# Patient Record
Sex: FEMALE | Race: WHITE | NOT HISPANIC OR LATINO
[De-identification: names, ages, dates, MRNs, and addresses within clinical notes are randomized per-mention and may not be internally consistent; named-entity substitution may affect disease eponyms.]

---

## 2021-05-07 ENCOUNTER — TRANSCRIPTION ENCOUNTER (OUTPATIENT)
Age: 36
End: 2021-05-07

## 2021-05-07 ENCOUNTER — OUTPATIENT (OUTPATIENT)
Dept: OUTPATIENT SERVICES | Facility: HOSPITAL | Age: 36
LOS: 1 days | End: 2021-05-07
Payer: COMMERCIAL

## 2021-05-07 DIAGNOSIS — Z20.828 CONTACT WITH AND (SUSPECTED) EXPOSURE TO OTHER VIRAL COMMUNICABLE DISEASES: ICD-10-CM

## 2021-05-07 LAB — SARS-COV-2 RNA SPEC QL NAA+PROBE: SIGNIFICANT CHANGE UP

## 2021-05-07 PROCEDURE — U0005: CPT

## 2021-05-07 PROCEDURE — C9803: CPT

## 2021-05-07 PROCEDURE — U0003: CPT

## 2021-05-08 DIAGNOSIS — Z20.828 CONTACT WITH AND (SUSPECTED) EXPOSURE TO OTHER VIRAL COMMUNICABLE DISEASES: ICD-10-CM

## 2021-05-10 DIAGNOSIS — Z12.31 ENCOUNTER FOR SCREENING MAMMOGRAM FOR MALIGNANT NEOPLASM OF BREAST: ICD-10-CM

## 2021-05-12 ENCOUNTER — APPOINTMENT (OUTPATIENT)
Dept: MAMMOGRAPHY | Facility: CLINIC | Age: 36
End: 2021-05-12
Payer: COMMERCIAL

## 2021-05-12 ENCOUNTER — RESULT REVIEW (OUTPATIENT)
Age: 36
End: 2021-05-12

## 2021-05-12 ENCOUNTER — OUTPATIENT (OUTPATIENT)
Dept: OUTPATIENT SERVICES | Facility: HOSPITAL | Age: 36
LOS: 1 days | End: 2021-05-12
Payer: COMMERCIAL

## 2021-05-12 DIAGNOSIS — Z12.31 ENCOUNTER FOR SCREENING MAMMOGRAM FOR MALIGNANT NEOPLASM OF BREAST: ICD-10-CM

## 2021-05-12 PROCEDURE — 77063 BREAST TOMOSYNTHESIS BI: CPT

## 2021-05-12 PROCEDURE — 77067 SCR MAMMO BI INCL CAD: CPT

## 2021-05-12 PROCEDURE — 77067 SCR MAMMO BI INCL CAD: CPT | Mod: 26

## 2021-05-12 PROCEDURE — 77063 BREAST TOMOSYNTHESIS BI: CPT | Mod: 26

## 2021-08-10 ENCOUNTER — APPOINTMENT (OUTPATIENT)
Dept: HUMAN REPRODUCTION | Facility: CLINIC | Age: 36
End: 2021-08-10
Payer: COMMERCIAL

## 2021-08-10 ENCOUNTER — APPOINTMENT (OUTPATIENT)
Dept: HUMAN REPRODUCTION | Facility: CLINIC | Age: 36
End: 2021-08-10

## 2021-08-10 PROCEDURE — 99204 OFFICE O/P NEW MOD 45 MIN: CPT | Mod: 95

## 2021-08-17 ENCOUNTER — APPOINTMENT (OUTPATIENT)
Dept: HUMAN REPRODUCTION | Facility: CLINIC | Age: 36
End: 2021-08-17

## 2021-09-01 ENCOUNTER — APPOINTMENT (OUTPATIENT)
Dept: HUMAN REPRODUCTION | Facility: CLINIC | Age: 36
End: 2021-09-01
Payer: COMMERCIAL

## 2021-09-01 PROCEDURE — 36415 COLL VENOUS BLD VENIPUNCTURE: CPT

## 2021-09-01 PROCEDURE — 99213 OFFICE O/P EST LOW 20 MIN: CPT | Mod: 25

## 2021-09-01 PROCEDURE — 76830 TRANSVAGINAL US NON-OB: CPT

## 2021-09-09 ENCOUNTER — APPOINTMENT (OUTPATIENT)
Dept: HUMAN REPRODUCTION | Facility: CLINIC | Age: 36
End: 2021-09-09
Payer: COMMERCIAL

## 2021-09-09 PROCEDURE — 99213 OFFICE O/P EST LOW 20 MIN: CPT | Mod: 25

## 2021-09-09 PROCEDURE — 76830 TRANSVAGINAL US NON-OB: CPT

## 2021-09-14 ENCOUNTER — APPOINTMENT (OUTPATIENT)
Dept: HUMAN REPRODUCTION | Facility: CLINIC | Age: 36
End: 2021-09-14
Payer: COMMERCIAL

## 2021-09-14 PROCEDURE — 99213 OFFICE O/P EST LOW 20 MIN: CPT | Mod: 25

## 2021-09-14 PROCEDURE — 36415 COLL VENOUS BLD VENIPUNCTURE: CPT

## 2021-09-14 PROCEDURE — 76830 TRANSVAGINAL US NON-OB: CPT

## 2021-09-21 ENCOUNTER — APPOINTMENT (OUTPATIENT)
Dept: HUMAN REPRODUCTION | Facility: CLINIC | Age: 36
End: 2021-09-21
Payer: COMMERCIAL

## 2021-09-21 PROCEDURE — 99213Y: CUSTOM | Mod: 25

## 2021-09-21 PROCEDURE — 36415 COLL VENOUS BLD VENIPUNCTURE: CPT

## 2021-09-21 PROCEDURE — 76830 TRANSVAGINAL US NON-OB: CPT

## 2021-10-01 ENCOUNTER — APPOINTMENT (OUTPATIENT)
Dept: HUMAN REPRODUCTION | Facility: CLINIC | Age: 36
End: 2021-10-01
Payer: COMMERCIAL

## 2021-10-01 PROCEDURE — 36415 COLL VENOUS BLD VENIPUNCTURE: CPT

## 2021-10-01 PROCEDURE — 99213 OFFICE O/P EST LOW 20 MIN: CPT | Mod: 25

## 2021-10-01 PROCEDURE — 76830 TRANSVAGINAL US NON-OB: CPT

## 2021-10-04 ENCOUNTER — MED ADMIN CHARGE (OUTPATIENT)
Age: 36
End: 2021-10-04

## 2021-10-04 ENCOUNTER — APPOINTMENT (OUTPATIENT)
Dept: FAMILY MEDICINE | Facility: CLINIC | Age: 36
End: 2021-10-04
Payer: COMMERCIAL

## 2021-10-04 PROCEDURE — G0008: CPT

## 2021-10-04 PROCEDURE — 90686 IIV4 VACC NO PRSV 0.5 ML IM: CPT

## 2021-10-08 ENCOUNTER — APPOINTMENT (OUTPATIENT)
Dept: HUMAN REPRODUCTION | Facility: CLINIC | Age: 36
End: 2021-10-08

## 2021-10-29 ENCOUNTER — APPOINTMENT (OUTPATIENT)
Dept: HUMAN REPRODUCTION | Facility: CLINIC | Age: 36
End: 2021-10-29
Payer: COMMERCIAL

## 2021-10-29 PROCEDURE — 76830 TRANSVAGINAL US NON-OB: CPT

## 2021-10-29 PROCEDURE — 36415 COLL VENOUS BLD VENIPUNCTURE: CPT

## 2021-10-29 PROCEDURE — 99213Y: CUSTOM | Mod: 25

## 2021-11-05 ENCOUNTER — APPOINTMENT (OUTPATIENT)
Dept: HUMAN REPRODUCTION | Facility: CLINIC | Age: 36
End: 2021-11-05
Payer: COMMERCIAL

## 2021-11-05 PROCEDURE — 99213 OFFICE O/P EST LOW 20 MIN: CPT | Mod: 25

## 2021-11-05 PROCEDURE — 76830 TRANSVAGINAL US NON-OB: CPT

## 2021-11-26 ENCOUNTER — APPOINTMENT (OUTPATIENT)
Dept: HUMAN REPRODUCTION | Facility: CLINIC | Age: 36
End: 2021-11-26
Payer: COMMERCIAL

## 2021-11-26 PROCEDURE — 36415 COLL VENOUS BLD VENIPUNCTURE: CPT

## 2021-11-26 PROCEDURE — 99213 OFFICE O/P EST LOW 20 MIN: CPT | Mod: 25

## 2021-11-26 PROCEDURE — 76830 TRANSVAGINAL US NON-OB: CPT

## 2021-12-02 ENCOUNTER — APPOINTMENT (OUTPATIENT)
Dept: INTERNAL MEDICINE | Facility: CLINIC | Age: 36
End: 2021-12-02
Payer: COMMERCIAL

## 2021-12-02 VITALS
OXYGEN SATURATION: 99 % | HEIGHT: 65 IN | SYSTOLIC BLOOD PRESSURE: 122 MMHG | WEIGHT: 273 LBS | DIASTOLIC BLOOD PRESSURE: 84 MMHG | BODY MASS INDEX: 45.48 KG/M2 | HEART RATE: 65 BPM | TEMPERATURE: 98.4 F

## 2021-12-02 DIAGNOSIS — Z80.3 FAMILY HISTORY OF MALIGNANT NEOPLASM OF BREAST: ICD-10-CM

## 2021-12-02 DIAGNOSIS — Z82.49 FAMILY HISTORY OF ISCHEMIC HEART DISEASE AND OTHER DISEASES OF THE CIRCULATORY SYSTEM: ICD-10-CM

## 2021-12-02 DIAGNOSIS — Z83.438 FAMILY HISTORY OF OTHER DISORDER OF LIPOPROTEIN METABOLISM AND OTHER LIPIDEMIA: ICD-10-CM

## 2021-12-02 DIAGNOSIS — E55.9 VITAMIN D DEFICIENCY, UNSPECIFIED: ICD-10-CM

## 2021-12-02 DIAGNOSIS — Z80.7 FAMILY HISTORY OF OTHER MALIGNANT NEOPLASMS OF LYMPHOID, HEMATOPOIETIC AND RELATED TISSUES: ICD-10-CM

## 2021-12-02 DIAGNOSIS — Z80.8 FAMILY HISTORY OF MALIGNANT NEOPLASM OF OTHER ORGANS OR SYSTEMS: ICD-10-CM

## 2021-12-02 PROCEDURE — 0013A: CPT

## 2021-12-02 PROCEDURE — 99385 PREV VISIT NEW AGE 18-39: CPT | Mod: 25

## 2021-12-02 NOTE — ASSESSMENT
[FreeTextEntry1] : modern booster given.\par All preventative measures were reviewed with the patient and the patient is due for and agrees to the following as outlined  in the plan  below.\par

## 2021-12-02 NOTE — HEALTH RISK ASSESSMENT
[No] : In the past 12 months have you used drugs other than those required for medical reasons? No [0] : 2) Feeling down, depressed, or hopeless: Not at all (0) [No falls in past year] : Patient reported no falls in the past year [PHQ-2 Negative - No further assessment needed] : PHQ-2 Negative - No further assessment needed [With Family] : lives with family [] :  [Smoke Detector] : smoke detector [Carbon Monoxide Detector] : carbon monoxide detector [Seat Belt] :  uses seat belt [Sunscreen] : uses sunscreen [With Patient/Caregiver] : , with patient/caregiver [] : No [de-identified] : no [de-identified] : no [OHK8Pidrx] : 0 [EyeExamDate] : 2019 [PapSmearDate] : 2020 [AdvancecareDate] : 12/2/21

## 2021-12-03 ENCOUNTER — APPOINTMENT (OUTPATIENT)
Dept: HUMAN REPRODUCTION | Facility: CLINIC | Age: 36
End: 2021-12-03
Payer: COMMERCIAL

## 2021-12-03 PROCEDURE — 76830 TRANSVAGINAL US NON-OB: CPT

## 2021-12-03 PROCEDURE — 99213 OFFICE O/P EST LOW 20 MIN: CPT | Mod: 25

## 2021-12-06 ENCOUNTER — APPOINTMENT (OUTPATIENT)
Dept: HUMAN REPRODUCTION | Facility: CLINIC | Age: 36
End: 2021-12-06

## 2021-12-16 ENCOUNTER — APPOINTMENT (OUTPATIENT)
Dept: HUMAN REPRODUCTION | Facility: CLINIC | Age: 36
End: 2021-12-16
Payer: COMMERCIAL

## 2021-12-16 PROCEDURE — 76830 TRANSVAGINAL US NON-OB: CPT

## 2021-12-16 PROCEDURE — 99213Y: CUSTOM | Mod: 25

## 2021-12-16 PROCEDURE — 36415 COLL VENOUS BLD VENIPUNCTURE: CPT

## 2021-12-29 ENCOUNTER — RX RENEWAL (OUTPATIENT)
Age: 36
End: 2021-12-29

## 2022-01-11 ENCOUNTER — TRANSCRIPTION ENCOUNTER (OUTPATIENT)
Age: 37
End: 2022-01-11

## 2022-01-11 LAB — SARS-COV-2 N GENE NPH QL NAA+PROBE: NOT DETECTED

## 2022-01-15 ENCOUNTER — TRANSCRIPTION ENCOUNTER (OUTPATIENT)
Age: 37
End: 2022-01-15

## 2022-02-03 ENCOUNTER — NON-APPOINTMENT (OUTPATIENT)
Age: 37
End: 2022-02-03

## 2022-02-03 ENCOUNTER — APPOINTMENT (OUTPATIENT)
Dept: DERMATOLOGY | Facility: CLINIC | Age: 37
End: 2022-02-03
Payer: COMMERCIAL

## 2022-02-03 PROCEDURE — 99204 OFFICE O/P NEW MOD 45 MIN: CPT | Mod: 25

## 2022-02-03 PROCEDURE — 11200 RMVL SKIN TAGS UP TO&INC 15: CPT

## 2022-02-04 ENCOUNTER — APPOINTMENT (OUTPATIENT)
Dept: OPHTHALMOLOGY | Facility: CLINIC | Age: 37
End: 2022-02-04
Payer: COMMERCIAL

## 2022-02-04 ENCOUNTER — NON-APPOINTMENT (OUTPATIENT)
Age: 37
End: 2022-02-04

## 2022-02-04 PROCEDURE — 92133 CPTRZD OPH DX IMG PST SGM ON: CPT

## 2022-02-04 PROCEDURE — 92004 COMPRE OPH EXAM NEW PT 1/>: CPT

## 2022-02-14 ENCOUNTER — APPOINTMENT (OUTPATIENT)
Dept: HUMAN REPRODUCTION | Facility: CLINIC | Age: 37
End: 2022-02-14
Payer: COMMERCIAL

## 2022-02-14 PROCEDURE — 36415 COLL VENOUS BLD VENIPUNCTURE: CPT

## 2022-02-14 PROCEDURE — 76830 TRANSVAGINAL US NON-OB: CPT

## 2022-02-14 PROCEDURE — 99213 OFFICE O/P EST LOW 20 MIN: CPT | Mod: 25

## 2022-02-17 ENCOUNTER — APPOINTMENT (OUTPATIENT)
Dept: OBGYN | Facility: CLINIC | Age: 37
End: 2022-02-17
Payer: COMMERCIAL

## 2022-02-17 VITALS
SYSTOLIC BLOOD PRESSURE: 118 MMHG | WEIGHT: 277 LBS | BODY MASS INDEX: 46.15 KG/M2 | DIASTOLIC BLOOD PRESSURE: 88 MMHG | HEIGHT: 65 IN

## 2022-02-17 PROCEDURE — 99385 PREV VISIT NEW AGE 18-39: CPT

## 2022-02-17 NOTE — PHYSICAL EXAM
[Appropriately responsive] : appropriately responsive [Alert] : alert [No Acute Distress] : no acute distress [No Lymphadenopathy] : no lymphadenopathy [Oriented x3] : oriented x3 [Examination Of The Breasts] : a normal appearance [No Discharge] : no discharge [No Masses] : no breast masses were palpable [Labia Majora] : normal [Labia Minora] : normal [Normal] : normal [FreeTextEntry4] : moderate thin white vaginal d/c consistent with BV - pt with no complaints of abnormal vaginal discharge

## 2022-02-17 NOTE — HISTORY OF PRESENT ILLNESS
[Patient reported mammogram was normal] : Patient reported mammogram was normal [Regular Cycle Intervals] : periods have been regular [Currently Active] : currently active [Men] : men [No] : No [TextBox_4] : Chanell is a 35 y/o  who presents today for an annual exam. She has no gyn complaints.\par \par She has regular menses. She has been seeing Dr. Zhang for infertility treatment.\par \par She is s/p a normal screening mammogram 2021.  She has a P. Aunt with a history of breast cancer.\par \par she is a  for a private primary MD in [place] [Mammogramdate] : 5/12/21 [FreeTextEntry1] : 2/10/22

## 2022-02-18 LAB — HPV HIGH+LOW RISK DNA PNL CVX: NOT DETECTED

## 2022-02-22 ENCOUNTER — NON-APPOINTMENT (OUTPATIENT)
Age: 37
End: 2022-02-22

## 2022-02-22 DIAGNOSIS — N76.0 ACUTE VAGINITIS: ICD-10-CM

## 2022-02-28 ENCOUNTER — APPOINTMENT (OUTPATIENT)
Dept: HUMAN REPRODUCTION | Facility: CLINIC | Age: 37
End: 2022-02-28

## 2022-04-28 ENCOUNTER — APPOINTMENT (OUTPATIENT)
Dept: FAMILY MEDICINE | Facility: CLINIC | Age: 37
End: 2022-04-28
Payer: COMMERCIAL

## 2022-04-28 VITALS
TEMPERATURE: 96.9 F | HEIGHT: 65 IN | OXYGEN SATURATION: 99 % | DIASTOLIC BLOOD PRESSURE: 82 MMHG | HEART RATE: 86 BPM | SYSTOLIC BLOOD PRESSURE: 120 MMHG

## 2022-04-28 PROCEDURE — 99213 OFFICE O/P EST LOW 20 MIN: CPT

## 2022-04-28 NOTE — REVIEW OF SYSTEMS
[Back Pain] : back pain [Headache] : headache [Dizziness] : no dizziness [Fainting] : no fainting [Confusion] : no confusion [Negative] : Psychiatric

## 2022-04-28 NOTE — HISTORY OF PRESENT ILLNESS
[FreeTextEntry8] : Patient is a 35yo female presenting to the office complaining of recurrent migraine headaches.\par Pt has been having bad migraines over the past several weeks.\par Has had migraines for many years.\par Has at least monthly, cannot pinpoint reasons, could be dehydration/stress related.\par Pt has been taking OTC medications (Ibuprofen/Acetaminophen) for headaches with some improvement.\par Pt has neck issues/stiffness that sometimes causes migraines.\par Pt states she has tried massage in the past with some improvement.\par Pt interested in acupuncture and possible breast reduction surgery.\par Has also tried PT in the past with some improvement as well.\par Pt has never been on prescription medication for migraines and has never seen Neurology.\par \par Pt requesting RF for Xanax, takes as needed for flight anxiety and otherwise rarely as needed.  Last RF >1 year ago ISTOP confirmed.

## 2022-04-28 NOTE — ASSESSMENT
[FreeTextEntry1] : Pt is a 35yo female presenting to the office complaining of worsening migraine headache symptoms and more frequent migraine headaches.  Interested in acupuncture/massage and possible breast reduction surgery in the future as migraines occasionally associated with neck strain from weight of breasts.  Also requesting Xanax RF.\par \par Migraine Headaches\par - Continue OTC medications as needed for migraines, incorporate Excedrin as well.\par - Sumatriptan ordered as needed for severe headaches.\par - Follow up with Neurology if symptoms persist/worsen.\par \par Neck Strain\par - Massage and Acupuncture RX provided.\par - Rest, gentle stretching, heat, Ibuprofen/Acetaminophen as needed.\par - Consider plastic surgery consult for breast reduction surgery.\par \par Flight Anxiety/generalized anxiety\par - RX for Xanax 0.25mg as needed sent to pharmacy.\par - Pt understands to take this medication sparingly only as needed.\par - Do not drink alcohol, drive, or operate heavy machinery when taking Xanax as it causes drowsiness.\par \par Call the office or go to the ED immediately if you develop new, worsening or concerning symptoms including high fever, severe headache/worst headache of your life, confusion, dizziness/lightheadedness, loss of consciousness, severe chest pain, difficulty breathing, shortness of breath, severe abdominal pain, excessive vomiting/diarrhea, inability to feel/move the extremities, or any other concerning symptoms.\par

## 2022-05-13 ENCOUNTER — APPOINTMENT (OUTPATIENT)
Dept: MAMMOGRAPHY | Facility: CLINIC | Age: 37
End: 2022-05-13
Payer: COMMERCIAL

## 2022-05-13 ENCOUNTER — RESULT REVIEW (OUTPATIENT)
Age: 37
End: 2022-05-13

## 2022-05-13 ENCOUNTER — OUTPATIENT (OUTPATIENT)
Dept: OUTPATIENT SERVICES | Facility: HOSPITAL | Age: 37
LOS: 1 days | End: 2022-05-13
Payer: COMMERCIAL

## 2022-05-13 DIAGNOSIS — Z12.39 ENCOUNTER FOR OTHER SCREENING FOR MALIGNANT NEOPLASM OF BREAST: ICD-10-CM

## 2022-05-13 PROCEDURE — 77067 SCR MAMMO BI INCL CAD: CPT | Mod: 26

## 2022-05-13 PROCEDURE — 77063 BREAST TOMOSYNTHESIS BI: CPT

## 2022-05-13 PROCEDURE — 77063 BREAST TOMOSYNTHESIS BI: CPT | Mod: 26

## 2022-05-13 PROCEDURE — 77067 SCR MAMMO BI INCL CAD: CPT

## 2022-06-24 ENCOUNTER — NON-APPOINTMENT (OUTPATIENT)
Age: 37
End: 2022-06-24

## 2022-06-27 RX ORDER — FENTANYL CITRATE 50 UG/ML
50 INJECTION INTRAVENOUS
Refills: 0 | Status: DISCONTINUED | OUTPATIENT
Start: 2022-06-28 | End: 2022-06-28

## 2022-06-27 RX ORDER — SODIUM CHLORIDE 9 MG/ML
1000 INJECTION, SOLUTION INTRAVENOUS
Refills: 0 | Status: DISCONTINUED | OUTPATIENT
Start: 2022-06-28 | End: 2022-06-28

## 2022-06-27 RX ORDER — PROCHLORPERAZINE MALEATE 5 MG
10 TABLET ORAL ONCE
Refills: 0 | Status: DISCONTINUED | OUTPATIENT
Start: 2022-06-28 | End: 2022-06-28

## 2022-06-27 RX ORDER — MEPERIDINE HYDROCHLORIDE 50 MG/ML
12.5 INJECTION INTRAMUSCULAR; INTRAVENOUS; SUBCUTANEOUS
Refills: 0 | Status: DISCONTINUED | OUTPATIENT
Start: 2022-06-28 | End: 2022-06-28

## 2022-06-27 RX ORDER — FENTANYL CITRATE 50 UG/ML
25 INJECTION INTRAVENOUS
Refills: 0 | Status: DISCONTINUED | OUTPATIENT
Start: 2022-06-28 | End: 2022-06-28

## 2022-06-28 ENCOUNTER — TRANSCRIPTION ENCOUNTER (OUTPATIENT)
Age: 37
End: 2022-06-28

## 2022-06-28 ENCOUNTER — OUTPATIENT (OUTPATIENT)
Dept: INPATIENT UNIT | Facility: HOSPITAL | Age: 37
LOS: 1 days | Discharge: HOME | End: 2022-06-28
Payer: COMMERCIAL

## 2022-06-28 ENCOUNTER — RESULT REVIEW (OUTPATIENT)
Age: 37
End: 2022-06-28

## 2022-06-28 VITALS
RESPIRATION RATE: 16 BRPM | TEMPERATURE: 97 F | SYSTOLIC BLOOD PRESSURE: 136 MMHG | OXYGEN SATURATION: 100 % | HEART RATE: 78 BPM | DIASTOLIC BLOOD PRESSURE: 86 MMHG

## 2022-06-28 VITALS
SYSTOLIC BLOOD PRESSURE: 143 MMHG | OXYGEN SATURATION: 99 % | HEART RATE: 80 BPM | WEIGHT: 260.59 LBS | DIASTOLIC BLOOD PRESSURE: 78 MMHG | HEIGHT: 65 IN | TEMPERATURE: 97 F | RESPIRATION RATE: 16 BRPM

## 2022-06-28 DIAGNOSIS — N62 HYPERTROPHY OF BREAST: ICD-10-CM

## 2022-06-28 DIAGNOSIS — G43.909 MIGRAINE, UNSPECIFIED, NOT INTRACTABLE, WITHOUT STATUS MIGRAINOSUS: ICD-10-CM

## 2022-06-28 DIAGNOSIS — Z98.890 OTHER SPECIFIED POSTPROCEDURAL STATES: Chronic | ICD-10-CM

## 2022-06-28 DIAGNOSIS — M54.2 CERVICALGIA: ICD-10-CM

## 2022-06-28 LAB — HCG UR QL: NEGATIVE — SIGNIFICANT CHANGE UP

## 2022-06-28 PROCEDURE — 88305 TISSUE EXAM BY PATHOLOGIST: CPT

## 2022-06-28 PROCEDURE — 81025 URINE PREGNANCY TEST: CPT

## 2022-06-28 PROCEDURE — 88305 TISSUE EXAM BY PATHOLOGIST: CPT | Mod: 26

## 2022-06-28 RX ORDER — OXYCODONE HYDROCHLORIDE 5 MG/1
5 TABLET ORAL ONCE
Refills: 0 | Status: DISCONTINUED | OUTPATIENT
Start: 2022-06-28 | End: 2022-06-28

## 2022-06-28 RX ORDER — BENZOCAINE AND MENTHOL 5; 1 G/100ML; G/100ML
1 LIQUID ORAL
Refills: 0 | Status: DISCONTINUED | OUTPATIENT
Start: 2022-06-28 | End: 2022-06-28

## 2022-06-28 RX ORDER — ONDANSETRON 8 MG/1
4 TABLET, FILM COATED ORAL ONCE
Refills: 0 | Status: COMPLETED | OUTPATIENT
Start: 2022-06-28 | End: 2022-06-28

## 2022-06-28 RX ADMIN — FENTANYL CITRATE 50 MICROGRAM(S): 50 INJECTION INTRAVENOUS at 12:02

## 2022-06-28 RX ADMIN — OXYCODONE HYDROCHLORIDE 5 MILLIGRAM(S): 5 TABLET ORAL at 10:26

## 2022-06-28 RX ADMIN — OXYCODONE HYDROCHLORIDE 5 MILLIGRAM(S): 5 TABLET ORAL at 12:02

## 2022-06-28 RX ADMIN — OXYCODONE HYDROCHLORIDE 5 MILLIGRAM(S): 5 TABLET ORAL at 11:46

## 2022-06-28 RX ADMIN — ONDANSETRON 4 MILLIGRAM(S): 8 TABLET, FILM COATED ORAL at 12:04

## 2022-06-28 RX ADMIN — OXYCODONE HYDROCHLORIDE 5 MILLIGRAM(S): 5 TABLET ORAL at 11:34

## 2022-06-28 RX ADMIN — FENTANYL CITRATE 50 MICROGRAM(S): 50 INJECTION INTRAVENOUS at 11:35

## 2022-06-28 RX ADMIN — FENTANYL CITRATE 50 MICROGRAM(S): 50 INJECTION INTRAVENOUS at 11:43

## 2022-06-28 RX ADMIN — BENZOCAINE AND MENTHOL 1 LOZENGE: 5; 1 LIQUID ORAL at 12:01

## 2022-06-28 RX ADMIN — SODIUM CHLORIDE 100 MILLILITER(S): 9 INJECTION, SOLUTION INTRAVENOUS at 10:27

## 2022-06-28 RX ADMIN — FENTANYL CITRATE 50 MICROGRAM(S): 50 INJECTION INTRAVENOUS at 10:24

## 2022-06-28 NOTE — ASU DISCHARGE PLAN (ADULT/PEDIATRIC) - NURSING INSTRUCTIONS
For any problems or concerns,contact your doctor. [facility] patients should call the [facility]. If you cannot reach the doctor or clinic, call [facility] Emergency Department at [phone] or go to your local Emergency Department.  A responsible adult should be with you for the rest of the day and night for your safety and to help you if you needed. Resume your medications as listed on the attached Medication Record. Begin with liquids and light food ( tea, toast, Jello, soups). Advance to what you normally eat. Liquids should taken in adequate amounts today.     CALL the DOCTOR:    -Fever greater than  101F  - Signs  of infection such as : increase pain,swelling,redness,or a bad  smell coming from the wound.  -Excessive amount of bleeding.  - Any pain that appears to be getting worse.  - Vomiting  -  If you have  not urinated 8 hours after surgery or have any difficulty urinating.     A responsible adult should be with you for the rest of the day and night for your safety and to help you if you needed.    Review attached FACT SHEET if applicable.

## 2022-06-28 NOTE — ASU DISCHARGE PLAN (ADULT/PEDIATRIC) - ASU DC SPECIAL INSTRUCTIONSFT
You should follow-up in the office on Friday for drain evaluation, please call for follow-up appointment if you have not already made one. [phone].   You have bilateral drains, record output and bring recording with you to your post operative visit.   Prescription pain medications have been prescribed for you, take as needed for pain only. Do not drive a vehicle or operate machinery while taking narcotic pain medication.   Ambulating is very important post operatively, make sure you ambulate several times daily.   If you experience bleeding that does not stop, swelling, hardness of surgical site, chest pain, shortness of breath, leg pain, dizziness or any signs or symptoms of infection such as fever, redness, pus, foul smell of surgical site please contact the office immediately, [phone].

## 2022-06-28 NOTE — ASU DISCHARGE PLAN (ADULT/PEDIATRIC) - NS MD DC FALL RISK RISK
For information on Fall & Injury Prevention, visit: [email or web address] OR  [email or web address] OR  https://www.cdc.gov/steadi/patient.html

## 2022-06-28 NOTE — ASU PATIENT PROFILE, ADULT - FALL HARM RISK - UNIVERSAL INTERVENTIONS
Bed in lowest position, wheels locked, appropriate side rails in place/Call bell, personal items and telephone in reach/Instruct patient to call for assistance before getting out of bed or chair/Non-slip footwear when patient is out of bed/[place] to call system/Physically safe environment - no spills, clutter or unnecessary equipment/Purposeful Proactive Rounding/Room/bathroom lighting operational, light cord in reach

## 2022-07-06 DIAGNOSIS — N64.81 PTOSIS OF BREAST: ICD-10-CM

## 2022-07-06 DIAGNOSIS — N62 HYPERTROPHY OF BREAST: ICD-10-CM

## 2022-07-06 DIAGNOSIS — F41.9 ANXIETY DISORDER, UNSPECIFIED: ICD-10-CM

## 2022-08-03 ENCOUNTER — LABORATORY RESULT (OUTPATIENT)
Age: 37
End: 2022-08-03

## 2022-08-04 ENCOUNTER — LABORATORY RESULT (OUTPATIENT)
Age: 37
End: 2022-08-04

## 2022-08-04 LAB
25(OH)D3 SERPL-MCNC: 29.7 NG/ML
ALBUMIN SERPL ELPH-MCNC: 4.5 G/DL
ALP BLD-CCNC: 79 U/L
ALT SERPL-CCNC: 7 U/L
ANION GAP SERPL CALC-SCNC: 10 MMOL/L
AST SERPL-CCNC: 9 U/L
BASOPHILS # BLD AUTO: 0.03 K/UL
BASOPHILS NFR BLD AUTO: 0.5 %
BILIRUB SERPL-MCNC: 0.5 MG/DL
BUN SERPL-MCNC: 10 MG/DL
CALCIUM SERPL-MCNC: 9.3 MG/DL
CHLORIDE SERPL-SCNC: 103 MMOL/L
CHOLEST SERPL-MCNC: 179 MG/DL
CO2 SERPL-SCNC: 25 MMOL/L
CREAT SERPL-MCNC: 0.67 MG/DL
EGFR: 114 ML/MIN/1.73M2
EOSINOPHIL # BLD AUTO: 0.11 K/UL
EOSINOPHIL NFR BLD AUTO: 1.9 %
GLUCOSE SERPL-MCNC: 94 MG/DL
HCT VFR BLD CALC: 40.1 %
HDLC SERPL-MCNC: 43 MG/DL
HGB BLD-MCNC: 12.8 G/DL
IMM GRANULOCYTES NFR BLD AUTO: 0.2 %
LDLC SERPL CALC-MCNC: 105 MG/DL
LYMPHOCYTES # BLD AUTO: 1.65 K/UL
LYMPHOCYTES NFR BLD AUTO: 28.1 %
MAN DIFF?: NORMAL
MCHC RBC-ENTMCNC: 26.8 PG
MCHC RBC-ENTMCNC: 31.9 GM/DL
MCV RBC AUTO: 84.1 FL
MONOCYTES # BLD AUTO: 0.46 K/UL
MONOCYTES NFR BLD AUTO: 7.8 %
NEUTROPHILS # BLD AUTO: 3.61 K/UL
NEUTROPHILS NFR BLD AUTO: 61.5 %
NONHDLC SERPL-MCNC: 136 MG/DL
PLATELET # BLD AUTO: 285 K/UL
POTASSIUM SERPL-SCNC: 3.9 MMOL/L
PROT SERPL-MCNC: 7.2 G/DL
RBC # BLD: 4.77 M/UL
RBC # FLD: 13.2 %
SODIUM SERPL-SCNC: 139 MMOL/L
T4 SERPL-MCNC: 7.1 UG/DL
TRIGL SERPL-MCNC: 153 MG/DL
TSH SERPL-ACNC: 2.03 UIU/ML
WBC # FLD AUTO: 5.87 K/UL

## 2022-08-07 LAB
APPEARANCE: CLEAR
BILIRUBIN URINE: NEGATIVE
BLOOD URINE: NEGATIVE
COLOR: NORMAL
GLUCOSE QUALITATIVE U: NEGATIVE
HCV AB SER QL: NONREACTIVE
HCV S/CO RATIO: 0.15 S/CO
KETONES URINE: NEGATIVE
LEUKOCYTE ESTERASE URINE: ABNORMAL
NITRITE URINE: NEGATIVE
PH URINE: 7
PROTEIN URINE: NEGATIVE
SPECIFIC GRAVITY URINE: 1.01
UROBILINOGEN URINE: NORMAL

## 2022-08-08 ENCOUNTER — NON-APPOINTMENT (OUTPATIENT)
Age: 37
End: 2022-08-08

## 2022-08-11 ENCOUNTER — APPOINTMENT (OUTPATIENT)
Dept: INTERNAL MEDICINE | Facility: CLINIC | Age: 37
End: 2022-08-11

## 2022-08-11 VITALS
HEART RATE: 87 BPM | TEMPERATURE: 98.6 F | HEIGHT: 65 IN | BODY MASS INDEX: 45.65 KG/M2 | DIASTOLIC BLOOD PRESSURE: 70 MMHG | SYSTOLIC BLOOD PRESSURE: 112 MMHG | WEIGHT: 274 LBS | OXYGEN SATURATION: 99 %

## 2022-08-11 DIAGNOSIS — U07.1 COVID-19: ICD-10-CM

## 2022-08-11 PROCEDURE — 99395 PREV VISIT EST AGE 18-39: CPT

## 2022-08-11 PROCEDURE — G0444 DEPRESSION SCREEN ANNUAL: CPT | Mod: 59

## 2022-08-11 RX ORDER — FLUCONAZOLE 150 MG/1
150 TABLET ORAL
Qty: 2 | Refills: 0 | Status: DISCONTINUED | COMMUNITY
Start: 2022-02-22 | End: 2022-08-11

## 2022-08-11 RX ORDER — DICYCLOMINE HYDROCHLORIDE 10 MG/1
10 CAPSULE ORAL DAILY
Qty: 30 | Refills: 0 | Status: DISCONTINUED | COMMUNITY
Start: 2021-12-02 | End: 2022-08-11

## 2022-08-11 RX ORDER — METRONIDAZOLE 7.5 MG/G
0.75 GEL VAGINAL
Qty: 1 | Refills: 0 | Status: DISCONTINUED | COMMUNITY
Start: 2022-02-22 | End: 2022-08-11

## 2022-08-11 NOTE — HEALTH RISK ASSESSMENT
[Never] : Never [No] : In the past 12 months have you used drugs other than those required for medical reasons? No [0] : 2) Feeling down, depressed, or hopeless: Not at all (0) [Patient reported mammogram was normal] : Patient reported mammogram was normal [Patient reported PAP Smear was normal] : Patient reported PAP Smear was normal [No falls in past year] : Patient reported no falls in the past year [PHQ-2 Negative - No further assessment needed] : PHQ-2 Negative - No further assessment needed [None] : None [With Family] : lives with family [] :  [Fully functional (bathing, dressing, toileting, transferring, walking, feeding)] : Fully functional (bathing, dressing, toileting, transferring, walking, feeding) [Fully functional (using the telephone, shopping, preparing meals, housekeeping, doing laundry, using] : Fully functional and needs no help or supervision to perform IADLs (using the telephone, shopping, preparing meals, housekeeping, doing laundry, using transportation, managing medications and managing finances) [Smoke Detector] : smoke detector [Carbon Monoxide Detector] : carbon monoxide detector [Seat Belt] :  uses seat belt [Sunscreen] : uses sunscreen [With Patient/Caregiver] : , with patient/caregiver [de-identified] : no [de-identified] : reg [LHG4Odhkr] : 0 [EyeExamDate] : 01/2022 [Change in mental status noted] : No change in mental status noted [MammogramDate] : 5/13/22 [PapSmearDate] : 2/17/22 [AdvancecareDate] : 8/11/22

## 2022-08-23 DIAGNOSIS — B37.9 CANDIDIASIS, UNSPECIFIED: ICD-10-CM

## 2022-08-23 PROBLEM — Z78.9 OTHER SPECIFIED HEALTH STATUS: Chronic | Status: ACTIVE | Noted: 2022-06-28

## 2022-08-29 NOTE — ASU PREOP CHECKLIST - PATIENT'S PERSONAL PROPERTY GIVEN TO
"Attempted to medicate pt. Pt states "Only morphine and Dilaudid work for me and if I am not going to get that here then its best that I go home". MD made aware.   " 3B/on unit

## 2022-10-21 ENCOUNTER — MED ADMIN CHARGE (OUTPATIENT)
Age: 37
End: 2022-10-21

## 2023-02-03 ENCOUNTER — NON-APPOINTMENT (OUTPATIENT)
Age: 38
End: 2023-02-03

## 2023-02-03 ENCOUNTER — APPOINTMENT (OUTPATIENT)
Dept: OPHTHALMOLOGY | Facility: CLINIC | Age: 38
End: 2023-02-03
Payer: COMMERCIAL

## 2023-02-03 PROCEDURE — 92250 FUNDUS PHOTOGRAPHY W/I&R: CPT

## 2023-02-03 PROCEDURE — 92014 COMPRE OPH EXAM EST PT 1/>: CPT

## 2023-02-09 ENCOUNTER — APPOINTMENT (OUTPATIENT)
Dept: DERMATOLOGY | Facility: CLINIC | Age: 38
End: 2023-02-09
Payer: COMMERCIAL

## 2023-02-09 PROCEDURE — 99213 OFFICE O/P EST LOW 20 MIN: CPT

## 2023-02-27 ENCOUNTER — NON-APPOINTMENT (OUTPATIENT)
Age: 38
End: 2023-02-27

## 2023-03-06 ENCOUNTER — APPOINTMENT (OUTPATIENT)
Dept: OBGYN | Facility: CLINIC | Age: 38
End: 2023-03-06
Payer: COMMERCIAL

## 2023-03-06 VITALS
DIASTOLIC BLOOD PRESSURE: 80 MMHG | WEIGHT: 270 LBS | SYSTOLIC BLOOD PRESSURE: 128 MMHG | BODY MASS INDEX: 44.98 KG/M2 | HEIGHT: 65 IN

## 2023-03-06 PROCEDURE — 99395 PREV VISIT EST AGE 18-39: CPT

## 2023-03-06 NOTE — HISTORY OF PRESENT ILLNESS
[Currently Active] : currently active [No] : No [TextBox_4] : Chanell is a 36 y/o  who presents today for an annual exam.\par \par She was previously seeing Dr. Zhang for fertility treatments but has decided to discontinue seeking pregnancy unless it happens spontaneously.\par \par She is a  for a private primary MD in [place] [FreeTextEntry1] : 2/27/23

## 2023-03-06 NOTE — DISCUSSION/SUMMARY
[FreeTextEntry1] : 1) pap performed\par 2) rx for screening mammo issued\par \par Return to office in one year, sooner prn

## 2023-03-07 LAB — CYTOLOGY CVX/VAG DOC THIN PREP: NORMAL

## 2023-03-08 LAB — HPV HIGH+LOW RISK DNA PNL CVX: NOT DETECTED

## 2023-03-13 ENCOUNTER — TRANSCRIPTION ENCOUNTER (OUTPATIENT)
Age: 38
End: 2023-03-13

## 2023-03-27 RX ORDER — FLUTICASONE PROPIONATE 50 UG/1
50 SPRAY, METERED NASAL DAILY
Qty: 1 | Refills: 1 | Status: ACTIVE | COMMUNITY
Start: 2023-03-27 | End: 1900-01-01

## 2023-04-07 LAB
25(OH)D3 SERPL-MCNC: 15.7 NG/ML
ALBUMIN SERPL ELPH-MCNC: 4.3 G/DL
ALP BLD-CCNC: 74 U/L
ALT SERPL-CCNC: 9 U/L
ANION GAP SERPL CALC-SCNC: 12 MMOL/L
APPEARANCE: CLEAR
AST SERPL-CCNC: 11 U/L
BACTERIA: NEGATIVE
BASOPHILS # BLD AUTO: 0.04 K/UL
BASOPHILS NFR BLD AUTO: 0.8 %
BILIRUB DIRECT SERPL-MCNC: 0.1 MG/DL
BILIRUB SERPL-MCNC: 0.5 MG/DL
BILIRUBIN URINE: NEGATIVE
BLOOD URINE: NEGATIVE
BUN SERPL-MCNC: 12 MG/DL
CALCIUM SERPL-MCNC: 9.6 MG/DL
CHLORIDE SERPL-SCNC: 104 MMOL/L
CHOLEST SERPL-MCNC: 178 MG/DL
CO2 SERPL-SCNC: 24 MMOL/L
COLOR: COLORLESS
CREAT SERPL-MCNC: 0.74 MG/DL
EGFR: 105 ML/MIN/1.73M2
EOSINOPHIL # BLD AUTO: 0.09 K/UL
EOSINOPHIL NFR BLD AUTO: 1.7 %
ESTIMATED AVERAGE GLUCOSE: 108 MG/DL
FERRITIN SERPL-MCNC: 12 NG/ML
GLUCOSE QUALITATIVE U: NEGATIVE
GLUCOSE SERPL-MCNC: 85 MG/DL
HBA1C MFR BLD HPLC: 5.4 %
HCT VFR BLD CALC: 38.1 %
HCV AB SER QL: NONREACTIVE
HCV S/CO RATIO: 0.17 S/CO
HDLC SERPL-MCNC: 42 MG/DL
HGB BLD-MCNC: 11.6 G/DL
HYALINE CASTS: 1 /LPF
IMM GRANULOCYTES NFR BLD AUTO: 0.2 %
IRON SERPL-MCNC: 34 UG/DL
KETONES URINE: NEGATIVE
LDLC SERPL CALC-MCNC: 112 MG/DL
LEUKOCYTE ESTERASE URINE: ABNORMAL
LYMPHOCYTES # BLD AUTO: 1.71 K/UL
LYMPHOCYTES NFR BLD AUTO: 33 %
MAN DIFF?: NORMAL
MCHC RBC-ENTMCNC: 26.2 PG
MCHC RBC-ENTMCNC: 30.4 GM/DL
MCV RBC AUTO: 86 FL
MICROSCOPIC-UA: NORMAL
MONOCYTES # BLD AUTO: 0.4 K/UL
MONOCYTES NFR BLD AUTO: 7.7 %
NEUTROPHILS # BLD AUTO: 2.93 K/UL
NEUTROPHILS NFR BLD AUTO: 56.6 %
NITRITE URINE: NEGATIVE
NONHDLC SERPL-MCNC: 136 MG/DL
PH URINE: 6.5
PLATELET # BLD AUTO: 320 K/UL
POTASSIUM SERPL-SCNC: 4.2 MMOL/L
PROT SERPL-MCNC: 7.1 G/DL
PROTEIN URINE: NEGATIVE
RBC # BLD: 4.43 M/UL
RBC # FLD: 13.4 %
RED BLOOD CELLS URINE: 1 /HPF
SODIUM SERPL-SCNC: 140 MMOL/L
SPECIFIC GRAVITY URINE: 1.01
SQUAMOUS EPITHELIAL CELLS: 4 /HPF
T4 SERPL-MCNC: 8.1 UG/DL
TRIGL SERPL-MCNC: 123 MG/DL
TSH SERPL-ACNC: 2.08 UIU/ML
UROBILINOGEN URINE: NORMAL
WBC # FLD AUTO: 5.18 K/UL
WHITE BLOOD CELLS URINE: 17 /HPF

## 2023-04-13 ENCOUNTER — APPOINTMENT (OUTPATIENT)
Dept: INTERNAL MEDICINE | Facility: CLINIC | Age: 38
End: 2023-04-13
Payer: COMMERCIAL

## 2023-04-13 VITALS
BODY MASS INDEX: 45.32 KG/M2 | DIASTOLIC BLOOD PRESSURE: 84 MMHG | OXYGEN SATURATION: 98 % | HEART RATE: 68 BPM | TEMPERATURE: 98.4 F | HEIGHT: 65 IN | WEIGHT: 272 LBS | SYSTOLIC BLOOD PRESSURE: 128 MMHG

## 2023-04-13 DIAGNOSIS — Z23 ENCOUNTER FOR IMMUNIZATION: ICD-10-CM

## 2023-04-13 DIAGNOSIS — Z86.69 PERSONAL HISTORY OF OTHER DISEASES OF THE NERVOUS SYSTEM AND SENSE ORGANS: ICD-10-CM

## 2023-04-13 DIAGNOSIS — S16.1XXA STRAIN OF MUSCLE, FASCIA AND TENDON AT NECK LEVEL, INITIAL ENCOUNTER: ICD-10-CM

## 2023-04-13 DIAGNOSIS — Z86.018 PERSONAL HISTORY OF OTHER BENIGN NEOPLASM: ICD-10-CM

## 2023-04-13 DIAGNOSIS — Z12.4 ENCOUNTER FOR SCREENING FOR MALIGNANT NEOPLASM OF CERVIX: ICD-10-CM

## 2023-04-13 PROCEDURE — 99395 PREV VISIT EST AGE 18-39: CPT

## 2023-04-13 RX ORDER — FLUCONAZOLE 150 MG/1
150 TABLET ORAL
Qty: 2 | Refills: 0 | Status: DISCONTINUED | COMMUNITY
Start: 2022-08-23 | End: 2023-04-13

## 2023-04-13 RX ORDER — ERYTHROMYCIN 5 MG/G
5 OINTMENT OPHTHALMIC
Qty: 1 | Refills: 0 | Status: DISCONTINUED | COMMUNITY
Start: 2023-03-26 | End: 2023-04-13

## 2023-04-13 NOTE — HEALTH RISK ASSESSMENT
[No] : In the past 12 months have you used drugs other than those required for medical reasons? No [0] : 2) Feeling down, depressed, or hopeless: Not at all (0) [Patient reported PAP Smear was normal] : Patient reported PAP Smear was normal [Never] : Never [No falls in past year] : Patient reported no falls in the past year [PHQ-2 Negative - No further assessment needed] : PHQ-2 Negative - No further assessment needed [None] : None [With Family] : lives with family [] :  [Fully functional (bathing, dressing, toileting, transferring, walking, feeding)] : Fully functional (bathing, dressing, toileting, transferring, walking, feeding) [Fully functional (using the telephone, shopping, preparing meals, housekeeping, doing laundry, using] : Fully functional and needs no help or supervision to perform IADLs (using the telephone, shopping, preparing meals, housekeeping, doing laundry, using transportation, managing medications and managing finances) [Smoke Detector] : smoke detector [Carbon Monoxide Detector] : carbon monoxide detector [Seat Belt] :  uses seat belt [Sunscreen] : uses sunscreen [With Patient/Caregiver] : , with patient/caregiver [de-identified] : no [de-identified] : no [MRI4Pxlto] : 0 [EyeExamDate] : 2023 [Reports changes in hearing] : Reports no changes in hearing [Guns at Home] : no guns at home [MammogramDate] : 5/1/22 [PapSmearDate] : 03/23 [AdvancecareDate] : 4/13/23

## 2023-04-18 ENCOUNTER — TRANSCRIPTION ENCOUNTER (OUTPATIENT)
Age: 38
End: 2023-04-18

## 2023-04-30 ENCOUNTER — OUTPATIENT (OUTPATIENT)
Dept: OUTPATIENT SERVICES | Facility: HOSPITAL | Age: 38
LOS: 1 days | End: 2023-04-30
Payer: COMMERCIAL

## 2023-04-30 ENCOUNTER — APPOINTMENT (OUTPATIENT)
Dept: ULTRASOUND IMAGING | Facility: CLINIC | Age: 38
End: 2023-04-30
Payer: COMMERCIAL

## 2023-04-30 DIAGNOSIS — Z98.890 OTHER SPECIFIED POSTPROCEDURAL STATES: Chronic | ICD-10-CM

## 2023-04-30 DIAGNOSIS — E01.0 IODINE-DEFICIENCY RELATED DIFFUSE (ENDEMIC) GOITER: ICD-10-CM

## 2023-04-30 PROCEDURE — 76536 US EXAM OF HEAD AND NECK: CPT | Mod: 26

## 2023-04-30 PROCEDURE — 76536 US EXAM OF HEAD AND NECK: CPT

## 2023-05-01 ENCOUNTER — NON-APPOINTMENT (OUTPATIENT)
Age: 38
End: 2023-05-01

## 2023-05-05 ENCOUNTER — APPOINTMENT (OUTPATIENT)
Dept: SURGERY | Facility: CLINIC | Age: 38
End: 2023-05-05
Payer: COMMERCIAL

## 2023-05-05 VITALS
DIASTOLIC BLOOD PRESSURE: 84 MMHG | OXYGEN SATURATION: 100 % | BODY MASS INDEX: 43.65 KG/M2 | HEART RATE: 69 BPM | HEIGHT: 65 IN | WEIGHT: 262 LBS | SYSTOLIC BLOOD PRESSURE: 127 MMHG

## 2023-05-05 PROCEDURE — 99204 OFFICE O/P NEW MOD 45 MIN: CPT

## 2023-05-05 NOTE — ASSESSMENT
[FreeTextEntry1] : 37F with class II obesity, BMI 43, presents for initial evaluation for possible bariatric surgery. Based on her medical history and weight loss goals, I believe she would be an excellent candidate for sleeve gastrectomy. We will initiate her preop workup and she will follow-up with me after testing is complete.

## 2023-05-05 NOTE — HISTORY OF PRESENT ILLNESS
[de-identified] : 37F with h/o recent bilateral reduction mammoplasty, Class II obesity with BMI 43, presents today to discuss her weight loss options. She reports she has tried losing weight through more traditional medical means and diets, including exercise, but with minimal effect. She is interested in bariatric surgery as a possible tool for long-term durable weight loss.\par Denies GERD, DM. No chronic NSAIDs.\par No tobacco use.\par No h/o eating disorders.

## 2023-05-18 ENCOUNTER — APPOINTMENT (OUTPATIENT)
Dept: MAMMOGRAPHY | Facility: CLINIC | Age: 38
End: 2023-05-18
Payer: COMMERCIAL

## 2023-05-18 ENCOUNTER — OUTPATIENT (OUTPATIENT)
Dept: OUTPATIENT SERVICES | Facility: HOSPITAL | Age: 38
LOS: 1 days | End: 2023-05-18
Payer: COMMERCIAL

## 2023-05-18 ENCOUNTER — RESULT REVIEW (OUTPATIENT)
Age: 38
End: 2023-05-18

## 2023-05-18 DIAGNOSIS — Z12.39 ENCOUNTER FOR OTHER SCREENING FOR MALIGNANT NEOPLASM OF BREAST: ICD-10-CM

## 2023-05-18 DIAGNOSIS — Z98.890 OTHER SPECIFIED POSTPROCEDURAL STATES: Chronic | ICD-10-CM

## 2023-05-18 PROCEDURE — 77067 SCR MAMMO BI INCL CAD: CPT

## 2023-05-18 PROCEDURE — 77063 BREAST TOMOSYNTHESIS BI: CPT

## 2023-05-18 PROCEDURE — 77063 BREAST TOMOSYNTHESIS BI: CPT | Mod: 26

## 2023-05-18 PROCEDURE — 77067 SCR MAMMO BI INCL CAD: CPT | Mod: 26

## 2023-06-22 ENCOUNTER — NON-APPOINTMENT (OUTPATIENT)
Age: 38
End: 2023-06-22

## 2023-06-27 ENCOUNTER — APPOINTMENT (OUTPATIENT)
Dept: PULMONOLOGY | Facility: CLINIC | Age: 38
End: 2023-06-27
Payer: COMMERCIAL

## 2023-06-27 ENCOUNTER — NON-APPOINTMENT (OUTPATIENT)
Age: 38
End: 2023-06-27

## 2023-06-27 VITALS
DIASTOLIC BLOOD PRESSURE: 86 MMHG | HEIGHT: 65 IN | OXYGEN SATURATION: 99 % | TEMPERATURE: 97.6 F | SYSTOLIC BLOOD PRESSURE: 132 MMHG | WEIGHT: 265 LBS | HEART RATE: 74 BPM | BODY MASS INDEX: 44.15 KG/M2

## 2023-06-27 PROCEDURE — 99205 OFFICE O/P NEW HI 60 MIN: CPT | Mod: 25

## 2023-06-27 PROCEDURE — 94727 GAS DIL/WSHOT DETER LNG VOL: CPT

## 2023-06-27 PROCEDURE — 94729 DIFFUSING CAPACITY: CPT

## 2023-06-27 PROCEDURE — 94010 BREATHING CAPACITY TEST: CPT

## 2023-06-27 RX ORDER — NITROFURANTOIN (MONOHYDRATE/MACROCRYSTALS) 25; 75 MG/1; MG/1
100 CAPSULE ORAL
Qty: 10 | Refills: 0 | Status: DISCONTINUED | COMMUNITY
Start: 2023-05-10 | End: 2023-06-27

## 2023-06-27 NOTE — HISTORY OF PRESENT ILLNESS
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

## 2023-06-27 NOTE — REVIEW OF SYSTEMS
[Seasonal Allergies] : seasonal allergies [Negative] : Endocrine [TextBox_122] : migraine headaches

## 2023-06-27 NOTE — REASON FOR VISIT
[Initial] : an initial visit [TextBox_44] : Medical Clearance.  Patient is having Bariatric surgery with Dr Bose date has not been established.

## 2023-07-07 ENCOUNTER — APPOINTMENT (OUTPATIENT)
Dept: BARIATRICS | Facility: CLINIC | Age: 38
End: 2023-07-07

## 2023-07-10 ENCOUNTER — APPOINTMENT (OUTPATIENT)
Dept: PULMONOLOGY | Facility: CLINIC | Age: 38
End: 2023-07-10

## 2023-07-18 ENCOUNTER — NON-APPOINTMENT (OUTPATIENT)
Age: 38
End: 2023-07-18

## 2023-07-19 ENCOUNTER — APPOINTMENT (OUTPATIENT)
Dept: GASTROENTEROLOGY | Facility: CLINIC | Age: 38
End: 2023-07-19
Payer: COMMERCIAL

## 2023-07-19 VITALS
WEIGHT: 261 LBS | HEIGHT: 65 IN | SYSTOLIC BLOOD PRESSURE: 138 MMHG | DIASTOLIC BLOOD PRESSURE: 76 MMHG | BODY MASS INDEX: 43.49 KG/M2

## 2023-07-19 LAB — 25(OH)D3 SERPL-MCNC: 30.6 NG/ML

## 2023-07-19 PROCEDURE — 99203 OFFICE O/P NEW LOW 30 MIN: CPT

## 2023-07-19 NOTE — HISTORY OF PRESENT ILLNESS
[FreeTextEntry1] : Chanell Reddy is a 38 year old female no PMH presents today for initial evaluation of GERD. Notes her symptoms have been consistent despite OTC medications. Denies nausea, vomiting, dysphagia, unintentional weight loss. of note, is also undergoing workup for bariatric surgery. Denies any bowel complaints, typically has bowel movement daily without significant straining or overt bleeding such as melena or hematochezia.

## 2023-07-19 NOTE — ASSESSMENT
[FreeTextEntry1] : Plan:\par Since pt reports frequent GERD symptoms and is also undergoing workup for bariatric surgery, would recommend EGD. Risks versus benefits as well as instructions reviewed, pt agrees to planned procedure. All questions answered. I have spent 30 minutes on this encounter.

## 2023-07-19 NOTE — REVIEW OF SYSTEMS
[Abdominal Pain] : no abdominal pain [Vomiting] : no vomiting [Constipation] : no constipation [Diarrhea] : no diarrhea [Heartburn] : heartburn [Melena (black stool)] : no melena [Bleeding] : no bleeding [Fecal Incontinence (soiling)] : no fecal incontinence [Bloating (gassiness)] : no bloating [Negative] : Heme/Lymph

## 2023-07-20 ENCOUNTER — APPOINTMENT (OUTPATIENT)
Dept: CARDIOLOGY | Facility: CLINIC | Age: 38
End: 2023-07-20
Payer: COMMERCIAL

## 2023-07-20 ENCOUNTER — NON-APPOINTMENT (OUTPATIENT)
Age: 38
End: 2023-07-20

## 2023-07-20 VITALS
HEIGHT: 65 IN | DIASTOLIC BLOOD PRESSURE: 86 MMHG | OXYGEN SATURATION: 99 % | HEART RATE: 67 BPM | WEIGHT: 261 LBS | BODY MASS INDEX: 43.49 KG/M2 | SYSTOLIC BLOOD PRESSURE: 134 MMHG

## 2023-07-20 PROCEDURE — 99204 OFFICE O/P NEW MOD 45 MIN: CPT

## 2023-07-20 PROCEDURE — 93000 ELECTROCARDIOGRAM COMPLETE: CPT | Mod: NC

## 2023-07-20 NOTE — HISTORY OF PRESENT ILLNESS
[FreeTextEntry1] : Pt is a 39 y/o F who is referred here today by their PCP for evaluation.  She is planning to bariatric surgery with Dr Bose. \par Pt reports feeling well and has no active cardiac complaints - denies CP, SOB, palpitations, dizziness, syncope, edema, orthopnea, PND, orthopnea.  No exertional symptoms.\par \par PMH: BMI 43\par Family hx: mother HTN, father HLD\par Smoking status: never\par social ETOH\par no drug use\par Current exercise: none\par Daily water intake: 90-120oz\par Daily caffeine intake: 1-2 shots espresso\par OTC medications: MVI, vit D, cardiac \par NKDA\par Previous cardiac testing: none\par Previous hospitalizations: breast reduction 2022, bleph - no problems with anesthesia\par \par

## 2023-07-20 NOTE — DISCUSSION/SUMMARY
[FreeTextEntry1] : Pt is a 37 y/o F who is referred here today by their PCP for evaluation.  She is planning to bariatric surgery with Dr Bose. \par \par BMI 43:\par planning to have bariatric surgery\par Will check transthoracic echocardiogram to evaluate left ventricular function and assess for any structural abnormalities\par will perform ETT to assess patient's current cardiac reserve to incremental activity and check for provocable ECG changes.\par Further preop recommendations will be made once diagnostic testing is complete. \par \par HLD:\par Advised lifestyle modifications \par \par Most recent available lab results were reviewed with pt.\par The described plan was discussed with the pt.  All questions and concerns were addressed to the best of my knowledge.

## 2023-07-21 ENCOUNTER — NON-APPOINTMENT (OUTPATIENT)
Age: 38
End: 2023-07-21

## 2023-08-01 ENCOUNTER — APPOINTMENT (OUTPATIENT)
Dept: ORTHOPEDIC SURGERY | Facility: CLINIC | Age: 38
End: 2023-08-01
Payer: COMMERCIAL

## 2023-08-01 VITALS
WEIGHT: 258 LBS | BODY MASS INDEX: 42.99 KG/M2 | HEIGHT: 65 IN | DIASTOLIC BLOOD PRESSURE: 84 MMHG | HEART RATE: 74 BPM | SYSTOLIC BLOOD PRESSURE: 130 MMHG

## 2023-08-01 PROCEDURE — 99212 OFFICE O/P EST SF 10 MIN: CPT

## 2023-08-01 PROCEDURE — 73600 X-RAY EXAM OF ANKLE: CPT | Mod: 26,LT

## 2023-08-02 NOTE — PHYSICAL EXAM
[de-identified] : Left Ankle: Significant tenderness on the anterior talofibular ligament. No calf tenderness. Good distal pulses. She has no tenderness on the medial side. She has good distal pulses. She can do flexion, dorsiflexion and plantar flexion and it did not affect inversion and eversion at all.  [de-identified] : She comes in today on crutches with flip flops on.  [de-identified] : Appearance:  Well-developed, well-nourished female in no acute distress.   [de-identified] : Radiographs, which were taken in the office today, two views of the left ankle, show no acute fractures or dislocations.

## 2023-08-02 NOTE — HISTORY OF PRESENT ILLNESS
[de-identified] : The patient comes in today with a left ankle injury. She states she twisted it going down two of her stairs at her house (she was wearing flip flops). The patient states the onset/injury occurred on 7/30/2023. The patient states the pain is intermittent and localized. The patient describes the pain as throbbing. The patient notes rest and ice makes her symptoms better, while walking and bending makes her symptoms worse and the patient notes that she cannot put weight on the foot. The patient indicates a pain level of 8 on a pain scale of 0-10.   [] : Yes [6] : the relief from medicine is 6/10 [7] : the ailment interference is 7/10 [10  (interferes completely)] : the ailment interference is10/10 (interferes completely) [de-identified] : Advil

## 2023-08-02 NOTE — ADDENDUM
[FreeTextEntry1] : This note was written by Marylin Lyle on 08/02/2023 acting as scribe for Carolyn Hanson, OTR/L, PA

## 2023-08-02 NOTE — DISCUSSION/SUMMARY
[de-identified] : At this time, due to left ankle sprain, the patient will be placed into a CAM boot because she has a lot of swelling. She does have a young child and is on her feet all day. She is advised to elevate, ice and two Advil.  She will be out of work for two weeks and will return to the office in two weeks. We will reassess for a low-profile ankle brace.

## 2023-08-08 ENCOUNTER — APPOINTMENT (OUTPATIENT)
Dept: BARIATRICS | Facility: CLINIC | Age: 38
End: 2023-08-08
Payer: COMMERCIAL

## 2023-08-08 VITALS — WEIGHT: 257 LBS | HEIGHT: 65 IN | BODY MASS INDEX: 42.82 KG/M2

## 2023-08-08 PROCEDURE — 97802 MEDICAL NUTRITION INDIV IN: CPT | Mod: 95

## 2023-08-14 RX ORDER — SUMATRIPTAN 25 MG/1
25 TABLET, FILM COATED ORAL
Qty: 30 | Refills: 4 | Status: ACTIVE | COMMUNITY
Start: 2022-04-28 | End: 1900-01-01

## 2023-08-15 ENCOUNTER — APPOINTMENT (OUTPATIENT)
Dept: ORTHOPEDIC SURGERY | Facility: CLINIC | Age: 38
End: 2023-08-15
Payer: COMMERCIAL

## 2023-08-15 VITALS
SYSTOLIC BLOOD PRESSURE: 126 MMHG | DIASTOLIC BLOOD PRESSURE: 87 MMHG | WEIGHT: 257 LBS | HEART RATE: 69 BPM | BODY MASS INDEX: 42.82 KG/M2 | HEIGHT: 65 IN

## 2023-08-15 PROCEDURE — 73630 X-RAY EXAM OF FOOT: CPT | Mod: 26,LT

## 2023-08-15 PROCEDURE — 73600 X-RAY EXAM OF ANKLE: CPT | Mod: 26,LT

## 2023-08-15 PROCEDURE — 99213 OFFICE O/P EST LOW 20 MIN: CPT

## 2023-08-16 NOTE — DISCUSSION/SUMMARY
[de-identified] : At this time, due to left ankle sprain, I recommended to continue in the boot and wean out of the boot to a low-profile ankle brace.  She is going to return back to work on Monday in the CAM boot or the low-profile ankle brace and then return back to the office in 2 weeks.

## 2023-08-16 NOTE — ADDENDUM
[FreeTextEntry1] : This note was written by Andrea Sanchez on 08/16/2023, acting as a scribe for UYEN RILEY, JUAN/L, PA

## 2023-08-16 NOTE — PHYSICAL EXAM
[de-identified] : Left Ankle:  Range of motion not assessed secondary to the sprain and the tenderness.  There is ecchymosis as well.  She is  over the distal fibula, as well as the fifth metatarsal.  No calf tenderness.  Good distal pulses.    [de-identified] : Gait and Station:  Ambulating with a slightly antalgic to antalgic gait.  Normal Station.  [de-identified] : Appearance:  Well-developed, well-nourished female in no acute distress.   [de-identified] : Radiographs, two views of the left ankle taken in the office today, reveal no acute fractures or dislocations. Radiographs, three views of the left foot taken in the office today, reveal no acute fractures or dislocations.

## 2023-08-18 ENCOUNTER — APPOINTMENT (OUTPATIENT)
Dept: PULMONOLOGY | Facility: CLINIC | Age: 38
End: 2023-08-18

## 2023-08-24 ENCOUNTER — APPOINTMENT (OUTPATIENT)
Dept: ORTHOPEDIC SURGERY | Facility: CLINIC | Age: 38
End: 2023-08-24
Payer: COMMERCIAL

## 2023-08-24 VITALS
DIASTOLIC BLOOD PRESSURE: 88 MMHG | HEIGHT: 65 IN | HEART RATE: 75 BPM | WEIGHT: 257 LBS | BODY MASS INDEX: 42.82 KG/M2 | SYSTOLIC BLOOD PRESSURE: 143 MMHG

## 2023-08-24 PROCEDURE — 99212 OFFICE O/P EST SF 10 MIN: CPT

## 2023-08-29 NOTE — ADDENDUM
[FreeTextEntry1] : This note was written by Rina Weir on 08/29/2023 acting as scribe for Doug Briceño III, MD

## 2023-08-29 NOTE — PHYSICAL EXAM
[de-identified] : Left Ankle: Ankle: Range of Motion in Degrees: 	                                Claimant:	Normal:	 Dorsiflexion (Active)	40-degrees	40-degrees	 Dorsiflexion (Passive)	40-degrees	40-degrees	 Plantar (Active)       	40-degrees	40-degrees	 Plantar (Passive)     	40-degrees	40-degrees	 Inversion (Active)    	30-degrees	30-degrees	 Inversion (Passive)  	30-degrees	30-degrees	 Eversion  (Active)   	20-degrees	20-degrees	 Eversion (Passive)   	20-degrees	20-degrees	  No weakness in dorsiflexion, plantar flexion, inversion or eversion.  Normal sensation.  Positive diffuse swelling laterally.  Tender over the lateral ligaments.  No tenderness over the medial  ligaments.  No tenderness over the DLES.  No evidence of instability.  Negative anterior drawer sign.  No medial bony tenderness.  No proximal fibular tenderness.  No anterior, posterior, medial or lateral tendon tenderness.  No intra-articular swelling.  No extra-articular swelling, edema or tenderness.  No tenderness over the plantar aspect of the os calcis.  2+ DP and PT pulses. Diffuse ecchymosis.   [de-identified] : Gait and Station:  Ambulating in a boot.  [de-identified] : Appearance:  Well-developed, well-nourished female in no acute distress.

## 2023-08-29 NOTE — DISCUSSION/SUMMARY
[de-identified] : At this time, due to lateral ankle sprain of the left ankle, I recommend the patient continue with her current modalities and be reassessed in two weeks.  I do not think she can work at this time.

## 2023-08-29 NOTE — HISTORY OF PRESENT ILLNESS
[de-identified] : The patient comes in today stating she is still having some pain to her left ankle.  She tried to wean off the boot and had difficulty.

## 2023-09-01 ENCOUNTER — APPOINTMENT (OUTPATIENT)
Dept: GASTROENTEROLOGY | Facility: HOSPITAL | Age: 38
End: 2023-09-01

## 2023-09-08 ENCOUNTER — APPOINTMENT (OUTPATIENT)
Dept: ORTHOPEDIC SURGERY | Facility: CLINIC | Age: 38
End: 2023-09-08
Payer: COMMERCIAL

## 2023-09-08 VITALS
WEIGHT: 257 LBS | BODY MASS INDEX: 42.82 KG/M2 | DIASTOLIC BLOOD PRESSURE: 89 MMHG | HEIGHT: 65 IN | HEART RATE: 75 BPM | SYSTOLIC BLOOD PRESSURE: 132 MMHG

## 2023-09-08 PROCEDURE — 99212 OFFICE O/P EST SF 10 MIN: CPT

## 2023-10-04 ENCOUNTER — NON-APPOINTMENT (OUTPATIENT)
Age: 38
End: 2023-10-04

## 2023-10-06 ENCOUNTER — APPOINTMENT (OUTPATIENT)
Dept: ORTHOPEDIC SURGERY | Facility: CLINIC | Age: 38
End: 2023-10-06
Payer: COMMERCIAL

## 2023-10-06 VITALS
HEIGHT: 65 IN | WEIGHT: 257 LBS | SYSTOLIC BLOOD PRESSURE: 135 MMHG | HEART RATE: 79 BPM | DIASTOLIC BLOOD PRESSURE: 85 MMHG | BODY MASS INDEX: 42.82 KG/M2

## 2023-10-06 PROCEDURE — 99213 OFFICE O/P EST LOW 20 MIN: CPT

## 2023-10-11 ENCOUNTER — NON-APPOINTMENT (OUTPATIENT)
Age: 38
End: 2023-10-11

## 2023-10-11 ENCOUNTER — APPOINTMENT (OUTPATIENT)
Dept: OPHTHALMOLOGY | Facility: CLINIC | Age: 38
End: 2023-10-11
Payer: COMMERCIAL

## 2023-10-11 PROCEDURE — 92083 EXTENDED VISUAL FIELD XM: CPT

## 2023-10-11 PROCEDURE — 92012 INTRM OPH EXAM EST PATIENT: CPT

## 2023-10-11 PROCEDURE — 92133 CPTRZD OPH DX IMG PST SGM ON: CPT

## 2023-10-12 ENCOUNTER — NON-APPOINTMENT (OUTPATIENT)
Age: 38
End: 2023-10-12

## 2023-10-12 ENCOUNTER — MED ADMIN CHARGE (OUTPATIENT)
Age: 38
End: 2023-10-12

## 2023-10-12 DIAGNOSIS — Z23 ENCOUNTER FOR IMMUNIZATION: ICD-10-CM

## 2023-10-17 ENCOUNTER — APPOINTMENT (OUTPATIENT)
Dept: CARDIOLOGY | Facility: CLINIC | Age: 38
End: 2023-10-17

## 2023-10-19 ENCOUNTER — APPOINTMENT (OUTPATIENT)
Dept: ULTRASOUND IMAGING | Facility: HOSPITAL | Age: 38
End: 2023-10-19

## 2023-10-19 ENCOUNTER — APPOINTMENT (OUTPATIENT)
Dept: RADIOLOGY | Facility: HOSPITAL | Age: 38
End: 2023-10-19

## 2023-10-26 ENCOUNTER — APPOINTMENT (OUTPATIENT)
Dept: ENDOCRINOLOGY | Facility: CLINIC | Age: 38
End: 2023-10-26

## 2023-12-01 ENCOUNTER — APPOINTMENT (OUTPATIENT)
Dept: ORTHOPEDIC SURGERY | Facility: CLINIC | Age: 38
End: 2023-12-01

## 2023-12-01 ENCOUNTER — APPOINTMENT (OUTPATIENT)
Dept: OBGYN | Facility: CLINIC | Age: 38
End: 2023-12-01
Payer: COMMERCIAL

## 2023-12-01 VITALS
WEIGHT: 265.25 LBS | BODY MASS INDEX: 44.19 KG/M2 | HEIGHT: 65 IN | DIASTOLIC BLOOD PRESSURE: 86 MMHG | SYSTOLIC BLOOD PRESSURE: 128 MMHG

## 2023-12-01 PROCEDURE — 99203 OFFICE O/P NEW LOW 30 MIN: CPT

## 2023-12-06 ENCOUNTER — TRANSCRIPTION ENCOUNTER (OUTPATIENT)
Age: 38
End: 2023-12-06

## 2023-12-08 ENCOUNTER — NON-APPOINTMENT (OUTPATIENT)
Age: 38
End: 2023-12-08

## 2023-12-17 ENCOUNTER — TRANSCRIPTION ENCOUNTER (OUTPATIENT)
Age: 38
End: 2023-12-17

## 2023-12-20 DIAGNOSIS — S93.402A SPRAIN OF UNSPECIFIED LIGAMENT OF LEFT ANKLE, INITIAL ENCOUNTER: ICD-10-CM

## 2024-01-26 ENCOUNTER — APPOINTMENT (OUTPATIENT)
Dept: OBGYN | Facility: CLINIC | Age: 39
End: 2024-01-26
Payer: COMMERCIAL

## 2024-01-26 VITALS
DIASTOLIC BLOOD PRESSURE: 84 MMHG | BODY MASS INDEX: 43.32 KG/M2 | SYSTOLIC BLOOD PRESSURE: 138 MMHG | HEIGHT: 65 IN | WEIGHT: 260 LBS

## 2024-01-26 DIAGNOSIS — K58.9 IRRITABLE BOWEL SYNDROME W/OUT DIARRHEA: ICD-10-CM

## 2024-01-26 PROCEDURE — 99214 OFFICE O/P EST MOD 30 MIN: CPT

## 2024-01-26 NOTE — ASSESSMENT
[FreeTextEntry1] : BEBO RODRIGUEZ  is here for an appointment for a followup of weight loss medications.  BARIATRIC SURGERY HISTORY: n/a, pt has consult last year with Dr. Bose.  OBESITY COMORBIDITIES: anxiety CURRENT ANTI-OBESITY MEDICATIONS: phentermine OBESITY MEDICATION SIDE EFFECTS: none HISTORY OF WEIGHT LOSS MEDICATIONS:n/a   Goal Weight: Patient Current weight 265-->260 BMI:44.14-->43.27   Breakfast: 730am Hard boiled eggs, occasional skips when not working snack- chips, yogurt, orange, whatever is at the office Lunch: 1230- orders out, chiptole, occasionally bringd from home- soup, cold cuts, Smart ones, pizza, panera Snack: shakecology. Dinner: 7-730pm- chicken, tacos, limited red meat, fish, pastas, limited vegetables. Snacks: no snacking after dinner. Drinks: water only  Physical activity: bike 20-30 mins daily  Plan of Care:  Whole food eating based strategy. Increase diversity of plants/fiber in diet To increase phentermine to 37.5 mg, Discussed [facility] requirements for injectionable medications.  Patient s/p bariatrics consultation and PATH program. RX for zepbound sent.

## 2024-02-03 ENCOUNTER — NON-APPOINTMENT (OUTPATIENT)
Age: 39
End: 2024-02-03

## 2024-02-06 ENCOUNTER — NON-APPOINTMENT (OUTPATIENT)
Age: 39
End: 2024-02-06

## 2024-02-15 ENCOUNTER — APPOINTMENT (OUTPATIENT)
Dept: DERMATOLOGY | Facility: CLINIC | Age: 39
End: 2024-02-15
Payer: COMMERCIAL

## 2024-02-15 DIAGNOSIS — D23.9 OTHER BENIGN NEOPLASM OF SKIN, UNSPECIFIED: ICD-10-CM

## 2024-02-15 DIAGNOSIS — L91.8 OTHER HYPERTROPHIC DISORDERS OF THE SKIN: ICD-10-CM

## 2024-02-15 DIAGNOSIS — D22.9 MELANOCYTIC NEVI, UNSPECIFIED: ICD-10-CM

## 2024-02-15 PROCEDURE — 99213 OFFICE O/P EST LOW 20 MIN: CPT | Mod: 25

## 2024-02-15 PROCEDURE — 11200 RMVL SKIN TAGS UP TO&INC 15: CPT

## 2024-02-15 RX ORDER — KETOCONAZOLE 20.5 MG/ML
2 SHAMPOO, SUSPENSION TOPICAL
Qty: 3 | Refills: 2 | Status: ACTIVE | COMMUNITY
Start: 2022-02-03 | End: 1900-01-01

## 2024-02-15 RX ORDER — FLUOCINOLONE ACETONIDE 0.1 MG/ML
0.01 SOLUTION TOPICAL
Qty: 1 | Refills: 2 | Status: DISCONTINUED | COMMUNITY
Start: 2022-02-03 | End: 2024-02-15

## 2024-02-15 NOTE — PHYSICAL EXAM
[Alert] : alert [Oriented x 3] : ~L oriented x 3 [Well Nourished] : well nourished [Conjunctiva Non-injected] : conjunctiva non-injected [No Visual Lymphadenopathy] : no visual  lymphadenopathy [No Clubbing] : no clubbing [No Edema] : no edema [No Bromhidrosis] : no bromhidrosis [No Chromhidrosis] : no chromhidrosis [Full Body Skin Exam Performed] : performed [FreeTextEntry3] : General: Alert and oriented, in NAD.  All of the following were examined and were within normal limits, except as noted:   Scalp: Face, including eyelids, nose, lips, ears, oropharynx: Neck: Chest/Back/Abdomen: Bilateral Arms/Hands: Bilateral Legs/Feet: Buttocks, Genitalia, Anus/perineum:  	 Hair, Nails, Oral Mucosa, Eyes:   - tags under each breast x2 - light brown homogenous macule on lower lip - tan pink soft papule along milk line on R breast - homogenous brown macules on body - firm pigmented papule on L upper back - purple papules on bl labia majora

## 2024-02-15 NOTE — ASSESSMENT
[FreeTextEntry1] : Acrochordon/Skin tag inflamed x2 - Given irritation for 2 lesions, treated with iris scissors snip excision. Aluminum chloride used for hemostasis. Pt tolerated it well. WCI given The risks/benefits/alternatives of were explained to the patient, which include and are not limited to bleeding, infection, scarring or discoloration of skin, and recurrence of lesion. The patient expressed understanding of these risks and provided consent to the procedure. Time out with verification of patient and lesion site was performed. The site was prepped with rubbing alcohol. Lesions were snipped at the base. Procedure was without complication and well tolerated. Wound care was discussed.  Seborrheic dermatitis, scalp - chronic; stable - Diagnosis and treatment options discussed - Continue ketoconazole 2% shampoo EVERY OTHER DAY to affected areas on scalp. Leave on for 10 mins prior to rinsing off  Accessory nipple, R breast - Benign condition discussed with patient - No further treatment indicated at this time  Multiple melanocytic nevi, benign  - Monitor moles for changes - Recommend wearing hats and sun protective clothing or OTC sunscreen products (SPF 30+, broad band, EltaMD/La Roche Posay/Neutrogena) daily on your face and entire body (apply sunscreen atleast 30 minutes prior to going outside). Reapply sunscreen every 2 hours when outside.  Dermatofibroma, L upper back - Benign diagnosis discussed - it is a ball of scar tissue underneath the skin in response to prior injury or trauma (bug bite) - No further treatment needed at this time  Angiokeratoma, vulva - Benign condition discussed with patient - No further treatment indicated at this time  Melanocytic macule, lower lip - favor melanocytic activation - Benign condition discussed with patient - No further treatment indicated at this time  Screening exam for skin cancer - TBSE performed today  - Advised sun protection.  Recommended OTC sunscreen products, including SPF30+ with broadband UV protection as well as proper use.  Discussed OTC sun protective clothing - Counseled patient to monitor for changes, including ABCDEs of mole monitoring - Discussed self-skin exams - rtc q1yr for repeat skin exam or sooner if new/concerning lesion

## 2024-02-15 NOTE — HISTORY OF PRESENT ILLNESS
[FreeTextEntry1] : seb derm; skin check [de-identified] : Ms. BEBO RODRIGUEZ is a 38 year old F with breast reduction here for evaluation of below  #Seb derm of scalp - controlled with keto shampoo 3x per week  #Itchy tags under breasts #FBSE.  Spots scattered on body x years. Asymptomatic and unchanged. No alleviating/aggravating factors. Never been treated.  Derm Hx: [facility] Personal hx of skin cancer:  no FHx of skin cancer: mom with BCC Social Hx: parttime  at [employer]; has 9 yo daughter

## 2024-03-05 ENCOUNTER — TRANSCRIPTION ENCOUNTER (OUTPATIENT)
Age: 39
End: 2024-03-05

## 2024-03-06 ENCOUNTER — TRANSCRIPTION ENCOUNTER (OUTPATIENT)
Age: 39
End: 2024-03-06

## 2024-03-07 ENCOUNTER — APPOINTMENT (OUTPATIENT)
Dept: OBGYN | Facility: CLINIC | Age: 39
End: 2024-03-07

## 2024-03-12 ENCOUNTER — NON-APPOINTMENT (OUTPATIENT)
Age: 39
End: 2024-03-12

## 2024-03-12 ENCOUNTER — OUTPATIENT (OUTPATIENT)
Dept: OUTPATIENT SERVICES | Facility: HOSPITAL | Age: 39
LOS: 1 days | End: 2024-03-12

## 2024-03-12 ENCOUNTER — APPOINTMENT (OUTPATIENT)
Dept: INTERNAL MEDICINE | Facility: CLINIC | Age: 39
End: 2024-03-12

## 2024-03-12 DIAGNOSIS — Z98.890 OTHER SPECIFIED POSTPROCEDURAL STATES: Chronic | ICD-10-CM

## 2024-03-14 ENCOUNTER — NON-APPOINTMENT (OUTPATIENT)
Age: 39
End: 2024-03-14

## 2024-03-18 ENCOUNTER — APPOINTMENT (OUTPATIENT)
Dept: OBGYN | Facility: CLINIC | Age: 39
End: 2024-03-18
Payer: COMMERCIAL

## 2024-03-18 VITALS
DIASTOLIC BLOOD PRESSURE: 80 MMHG | WEIGHT: 266 LBS | BODY MASS INDEX: 44.32 KG/M2 | SYSTOLIC BLOOD PRESSURE: 122 MMHG | HEIGHT: 65 IN

## 2024-03-18 DIAGNOSIS — Z12.4 ENCOUNTER FOR SCREENING FOR MALIGNANT NEOPLASM OF CERVIX: ICD-10-CM

## 2024-03-18 DIAGNOSIS — R39.9 UNSPECIFIED SYMPTOMS AND SIGNS INVOLVING THE GENITOURINARY SYSTEM: ICD-10-CM

## 2024-03-18 PROCEDURE — 99396 PREV VISIT EST AGE 40-64: CPT

## 2024-03-18 RX ORDER — BENZONATATE 200 MG/1
200 CAPSULE ORAL
Qty: 60 | Refills: 0 | Status: DISCONTINUED | COMMUNITY
Start: 2023-12-27 | End: 2024-03-18

## 2024-03-18 RX ORDER — ERGOCALCIFEROL 1.25 MG/1
1.25 MG CAPSULE ORAL
Qty: 12 | Refills: 0 | Status: DISCONTINUED | COMMUNITY
Start: 2023-04-13 | End: 2024-03-18

## 2024-03-18 NOTE — PHYSICAL EXAM
[Appropriately responsive] : appropriately responsive [Alert] : alert [No Acute Distress] : no acute distress [No Lymphadenopathy] : no lymphadenopathy [Oriented x3] : oriented x3 [Examination Of The Breasts] : a normal appearance [No Discharge] : no discharge [No Masses] : no breast masses were palpable [Labia Minora] : normal [Labia Majora] : normal [Uterine Adnexae] : normal [Normal] : normal [FreeTextEntry5] : friable cervix

## 2024-03-18 NOTE — HISTORY OF PRESENT ILLNESS
[TextBox_4] : Chanell is a 39 y/o  who presents today for an annual exam.  She is a  for a PCP, Wesley.  she has been started on weight loss medication.  She is using a stationary bike at home and walking.

## 2024-03-19 LAB — HPV HIGH+LOW RISK DNA PNL CVX: NOT DETECTED

## 2024-03-20 LAB — CYTOLOGY CVX/VAG DOC THIN PREP: ABNORMAL

## 2024-03-22 ENCOUNTER — TRANSCRIPTION ENCOUNTER (OUTPATIENT)
Age: 39
End: 2024-03-22

## 2024-04-04 ENCOUNTER — APPOINTMENT (OUTPATIENT)
Dept: OBGYN | Facility: CLINIC | Age: 39
End: 2024-04-04
Payer: COMMERCIAL

## 2024-04-04 VITALS — HEIGHT: 65 IN | BODY MASS INDEX: 42.99 KG/M2 | WEIGHT: 258 LBS

## 2024-04-04 PROCEDURE — 99442: CPT

## 2024-04-04 NOTE — REASON FOR VISIT
[Home] : at home, [unfilled] , at the time of the visit. [Medical Office: ([facility])___] : at the medical office located in  [Patient] : the patient [This encounter was initiated by telehealth (audio with video) and converted to telephone (audio only) due to technical difficulties.] : This encounter was initiated by telehealth (audio with video) and converted to telephone (audio only) due to technical difficulties. [Follow-Up] : a follow-up visit

## 2024-04-04 NOTE — ASSESSMENT
[FreeTextEntry1] : BEBO RODRIGUEZ  is here for an appointment for a followup of weight loss medications.  BARIATRIC SURGERY HISTORY: n/a, OBESITY COMORBIDITIES: anxiety CURRENT ANTI-OBESITY MEDICATIONS: phentermine OBESITY MEDICATION SIDE EFFECTS: none HISTORY OF WEIGHT LOSS MEDICATIONS:n/a   Goal Weight: Patient Current weight 265-->260 BMI:44.14-->43.27   Breakfast: 730am Hard boiled eggs, snack- chips, yogurt, orange, whatever is at the office Lunch: 1230-  bringing food to work, lean cuisines,- soup, cold cuts, Smart ones, pizza, panera Snack: shakecology. Dinner: 7-730pm- chicken, tacos, limited red meat, fish, pastas, limited vegetable, salads  Snacks: no snacking after dinner. Drinks: water only  Physical activity: bike 20-30 mins daily  Plan of Care:  Whole food eating based strategy. Increase diversity of plants/fiber in diet To continue phentermine until zepbound is back in stock Patient s/p bariatrics consultation and PATH program.  Zepbound on backordered

## 2024-04-16 LAB
25(OH)D3 SERPL-MCNC: 22.6 NG/ML
ALBUMIN SERPL ELPH-MCNC: 4.5 G/DL
ALP BLD-CCNC: 78 U/L
ALT SERPL-CCNC: 9 U/L
ANION GAP SERPL CALC-SCNC: 13 MMOL/L
APPEARANCE: CLEAR
AST SERPL-CCNC: 11 U/L
BACTERIA: NEGATIVE /HPF
BILIRUB DIRECT SERPL-MCNC: 0.1 MG/DL
BILIRUB SERPL-MCNC: 0.6 MG/DL
BILIRUBIN URINE: NEGATIVE
BLOOD URINE: ABNORMAL
BUN SERPL-MCNC: 12 MG/DL
CALCIUM SERPL-MCNC: 9.6 MG/DL
CAST: 0 /LPF
CHLORIDE SERPL-SCNC: 104 MMOL/L
CHOLEST SERPL-MCNC: 182 MG/DL
CO2 SERPL-SCNC: 24 MMOL/L
COLOR: YELLOW
CREAT SERPL-MCNC: 0.76 MG/DL
EGFR: 102 ML/MIN/1.73M2
EPITHELIAL CELLS: 3 /HPF
ESTIMATED AVERAGE GLUCOSE: 105 MG/DL
FERRITIN SERPL-MCNC: 8 NG/ML
GLUCOSE QUALITATIVE U: NEGATIVE MG/DL
GLUCOSE SERPL-MCNC: 86 MG/DL
HBA1C MFR BLD HPLC: 5.3 %
HCT VFR BLD CALC: 36 %
HCV AB SER QL: NONREACTIVE
HCV S/CO RATIO: 0.12 S/CO
HDLC SERPL-MCNC: 48 MG/DL
HGB BLD-MCNC: 11.1 G/DL
IRON SERPL-MCNC: 34 UG/DL
KETONES URINE: NEGATIVE MG/DL
LDLC SERPL CALC-MCNC: 118 MG/DL
LEUKOCYTE ESTERASE URINE: ABNORMAL
MCHC RBC-ENTMCNC: 25.1 PG
MCHC RBC-ENTMCNC: 30.8 GM/DL
MCV RBC AUTO: 81.3 FL
MICROSCOPIC-UA: NORMAL
NITRITE URINE: NEGATIVE
NONHDLC SERPL-MCNC: 134 MG/DL
PH URINE: 6
PLATELET # BLD AUTO: 304 K/UL
POTASSIUM SERPL-SCNC: 3.9 MMOL/L
PROT SERPL-MCNC: 7.3 G/DL
PROTEIN URINE: NEGATIVE MG/DL
RBC # BLD: 4.43 M/UL
RBC # FLD: 14.1 %
RED BLOOD CELLS URINE: 2 /HPF
REVIEW: NORMAL
SODIUM SERPL-SCNC: 140 MMOL/L
SPECIFIC GRAVITY URINE: 1.01
T4 SERPL-MCNC: 8.9 UG/DL
TRIGL SERPL-MCNC: 86 MG/DL
TSH SERPL-ACNC: 1.9 UIU/ML
UROBILINOGEN URINE: 0.2 MG/DL
WBC # FLD AUTO: 4.39 K/UL
WHITE BLOOD CELLS URINE: 20 /HPF

## 2024-04-18 ENCOUNTER — NON-APPOINTMENT (OUTPATIENT)
Age: 39
End: 2024-04-18

## 2024-04-18 ENCOUNTER — APPOINTMENT (OUTPATIENT)
Dept: INTERNAL MEDICINE | Facility: CLINIC | Age: 39
End: 2024-04-18
Payer: COMMERCIAL

## 2024-04-18 VITALS
WEIGHT: 260 LBS | HEART RATE: 61 BPM | SYSTOLIC BLOOD PRESSURE: 120 MMHG | TEMPERATURE: 98.2 F | HEIGHT: 65 IN | BODY MASS INDEX: 43.32 KG/M2 | DIASTOLIC BLOOD PRESSURE: 72 MMHG | OXYGEN SATURATION: 99 %

## 2024-04-18 DIAGNOSIS — Z00.00 ENCOUNTER FOR GENERAL ADULT MEDICAL EXAMINATION W/OUT ABNORMAL FINDINGS: ICD-10-CM

## 2024-04-18 DIAGNOSIS — K21.9 GASTRO-ESOPHAGEAL REFLUX DISEASE W/OUT ESOPHAGITIS: ICD-10-CM

## 2024-04-18 DIAGNOSIS — J30.9 ALLERGIC RHINITIS, UNSPECIFIED: ICD-10-CM

## 2024-04-18 DIAGNOSIS — Z11.59 ENCOUNTER FOR SCREENING FOR OTHER VIRAL DISEASES: ICD-10-CM

## 2024-04-18 DIAGNOSIS — Z13.21 ENCOUNTER FOR SCREENING FOR NUTRITIONAL DISORDER: ICD-10-CM

## 2024-04-18 DIAGNOSIS — Z86.59 PERSONAL HISTORY OF OTHER MENTAL AND BEHAVIORAL DISORDERS: ICD-10-CM

## 2024-04-18 DIAGNOSIS — D50.9 IRON DEFICIENCY ANEMIA, UNSPECIFIED: ICD-10-CM

## 2024-04-18 DIAGNOSIS — Z71.3 DIETARY COUNSELING AND SURVEILLANCE: ICD-10-CM

## 2024-04-18 DIAGNOSIS — E01.0 IODINE-DEFICIENCY RELATED DIFFUSE (ENDEMIC) GOITER: ICD-10-CM

## 2024-04-18 DIAGNOSIS — R31.21 ASYMPTOMATIC MICROSCOPIC HEMATURIA: ICD-10-CM

## 2024-04-18 DIAGNOSIS — Z01.810 ENCOUNTER FOR PREPROCEDURAL CARDIOVASCULAR EXAMINATION: ICD-10-CM

## 2024-04-18 DIAGNOSIS — L21.9 SEBORRHEIC DERMATITIS, UNSPECIFIED: ICD-10-CM

## 2024-04-18 DIAGNOSIS — F41.8 OTHER SPECIFIED ANXIETY DISORDERS: ICD-10-CM

## 2024-04-18 DIAGNOSIS — Z01.419 ENCOUNTER FOR GYNECOLOGICAL EXAMINATION (GENERAL) (ROUTINE) W/OUT ABNORMAL FINDINGS: ICD-10-CM

## 2024-04-18 DIAGNOSIS — Z12.39 ENCOUNTER FOR OTHER SCREENING FOR MALIGNANT NEOPLASM OF BREAST: ICD-10-CM

## 2024-04-18 DIAGNOSIS — G43.009 MIGRAINE W/OUT AURA, NOT INTRACTABLE, W/OUT STATUS MIGRAINOSUS: ICD-10-CM

## 2024-04-18 DIAGNOSIS — Z01.811 ENCOUNTER FOR PREPROCEDURAL RESPIRATORY EXAMINATION: ICD-10-CM

## 2024-04-18 DIAGNOSIS — Z12.83 ENCOUNTER FOR SCREENING FOR MALIGNANT NEOPLASM OF SKIN: ICD-10-CM

## 2024-04-18 LAB
DATE COLLECTED: NORMAL
HEMOCCULT SP1 STL QL: NEGATIVE
QUALITY CONTROL: YES

## 2024-04-18 PROCEDURE — 99396 PREV VISIT EST AGE 40-64: CPT

## 2024-04-18 PROCEDURE — 82270 OCCULT BLOOD FECES: CPT

## 2024-04-18 RX ORDER — ALPRAZOLAM 0.25 MG/1
0.25 TABLET ORAL
Qty: 30 | Refills: 0 | Status: ACTIVE | COMMUNITY
Start: 2022-04-28 | End: 1900-01-01

## 2024-04-18 NOTE — HEALTH RISK ASSESSMENT
[No] : In the past 12 months have you used drugs other than those required for medical reasons? No [0] : 2) Feeling down, depressed, or hopeless: Not at all (0) [Patient reported mammogram was normal] : Patient reported mammogram was normal [Patient reported PAP Smear was normal] : Patient reported PAP Smear was normal [Never] : Never [No falls in past year] : Patient reported no falls in the past year [PHQ-2 Negative - No further assessment needed] : PHQ-2 Negative - No further assessment needed [de-identified] : walking [de-identified] : weight watchers [[id number]] : 0 [EyeExamDate] : 1.1.24 [Change in mental status noted] : No change in mental status noted [None] : None [With Family] : lives with family [] :  [Fully functional (bathing, dressing, toileting, transferring, walking, feeding)] : Fully functional (bathing, dressing, toileting, transferring, walking, feeding) [Fully functional (using the telephone, shopping, preparing meals, housekeeping, doing laundry, using] : Fully functional and needs no help or supervision to perform IADLs (using the telephone, shopping, preparing meals, housekeeping, doing laundry, using transportation, managing medications and managing finances) [Smoke Detector] : smoke detector [Carbon Monoxide Detector] : carbon monoxide detector [Guns at Home] : no guns at home [Seat Belt] :  uses seat belt [Sunscreen] : uses sunscreen [MammogramDate] : 5/1/23 [PapSmearDate] : 03/2024 [With Patient/Caregiver] : , with patient/caregiver [AdvancecareDate] : 4/1/8/24

## 2024-04-19 ENCOUNTER — NON-APPOINTMENT (OUTPATIENT)
Age: 39
End: 2024-04-19

## 2024-04-21 LAB
APPEARANCE: CLEAR
BACTERIA UR CULT: NORMAL
BACTERIA: NEGATIVE /HPF
BILIRUBIN URINE: NEGATIVE
BLOOD URINE: NEGATIVE
CAST: 0 /LPF
COLOR: YELLOW
EPITHELIAL CELLS: 3 /HPF
GLUCOSE QUALITATIVE U: NEGATIVE MG/DL
KETONES URINE: NEGATIVE MG/DL
LEUKOCYTE ESTERASE URINE: ABNORMAL
MICROSCOPIC-UA: NORMAL
NITRITE URINE: NEGATIVE
PH URINE: 5.5
PROTEIN URINE: NEGATIVE MG/DL
RED BLOOD CELLS URINE: 1 /HPF
SPECIFIC GRAVITY URINE: 1.02
UROBILINOGEN URINE: 0.2 MG/DL
WHITE BLOOD CELLS URINE: 10 /HPF

## 2024-04-22 ENCOUNTER — TRANSCRIPTION ENCOUNTER (OUTPATIENT)
Age: 39
End: 2024-04-22

## 2024-04-30 ENCOUNTER — OUTPATIENT (OUTPATIENT)
Dept: OUTPATIENT SERVICES | Facility: HOSPITAL | Age: 39
LOS: 1 days | End: 2024-04-30
Payer: COMMERCIAL

## 2024-04-30 DIAGNOSIS — G47.33 OBSTRUCTIVE SLEEP APNEA (ADULT) (PEDIATRIC): ICD-10-CM

## 2024-04-30 DIAGNOSIS — Z98.890 OTHER SPECIFIED POSTPROCEDURAL STATES: Chronic | ICD-10-CM

## 2024-04-30 PROCEDURE — 95800 SLP STDY UNATTENDED: CPT

## 2024-04-30 PROCEDURE — 95800 SLP STDY UNATTENDED: CPT | Mod: 26

## 2024-05-13 ENCOUNTER — NON-APPOINTMENT (OUTPATIENT)
Age: 39
End: 2024-05-13

## 2024-05-15 ENCOUNTER — TRANSCRIPTION ENCOUNTER (OUTPATIENT)
Age: 39
End: 2024-05-15

## 2024-05-15 ENCOUNTER — NON-APPOINTMENT (OUTPATIENT)
Age: 39
End: 2024-05-15

## 2024-05-22 ENCOUNTER — APPOINTMENT (OUTPATIENT)
Dept: MAMMOGRAPHY | Facility: CLINIC | Age: 39
End: 2024-05-22
Payer: COMMERCIAL

## 2024-05-22 ENCOUNTER — OUTPATIENT (OUTPATIENT)
Dept: OUTPATIENT SERVICES | Facility: HOSPITAL | Age: 39
LOS: 1 days | End: 2024-05-22
Payer: COMMERCIAL

## 2024-05-22 ENCOUNTER — RESULT REVIEW (OUTPATIENT)
Age: 39
End: 2024-05-22

## 2024-05-22 DIAGNOSIS — Z12.39 ENCOUNTER FOR OTHER SCREENING FOR MALIGNANT NEOPLASM OF BREAST: ICD-10-CM

## 2024-05-22 PROCEDURE — 77063 BREAST TOMOSYNTHESIS BI: CPT

## 2024-05-22 PROCEDURE — 77067 SCR MAMMO BI INCL CAD: CPT | Mod: 26

## 2024-05-22 PROCEDURE — 77067 SCR MAMMO BI INCL CAD: CPT

## 2024-05-22 PROCEDURE — 77063 BREAST TOMOSYNTHESIS BI: CPT | Mod: 26

## 2024-05-29 ENCOUNTER — OUTPATIENT (OUTPATIENT)
Dept: OUTPATIENT SERVICES | Facility: HOSPITAL | Age: 39
LOS: 1 days | End: 2024-05-29
Payer: COMMERCIAL

## 2024-05-29 ENCOUNTER — APPOINTMENT (OUTPATIENT)
Dept: ULTRASOUND IMAGING | Facility: CLINIC | Age: 39
End: 2024-05-29
Payer: COMMERCIAL

## 2024-05-29 ENCOUNTER — RESULT REVIEW (OUTPATIENT)
Age: 39
End: 2024-05-29

## 2024-05-29 DIAGNOSIS — Z98.890 OTHER SPECIFIED POSTPROCEDURAL STATES: Chronic | ICD-10-CM

## 2024-05-29 DIAGNOSIS — R92.1 MAMMOGRAPHIC CALCIFICATION FOUND ON DIAGNOSTIC IMAGING OF BREAST: ICD-10-CM

## 2024-05-29 DIAGNOSIS — Z00.8 ENCOUNTER FOR OTHER GENERAL EXAMINATION: ICD-10-CM

## 2024-05-29 PROCEDURE — G0279: CPT | Mod: 26

## 2024-05-29 PROCEDURE — 77065 DX MAMMO INCL CAD UNI: CPT | Mod: 26,LT

## 2024-05-29 PROCEDURE — 77065 DX MAMMO INCL CAD UNI: CPT

## 2024-05-29 PROCEDURE — G0279: CPT

## 2024-06-04 ENCOUNTER — APPOINTMENT (OUTPATIENT)
Dept: MAMMOGRAPHY | Facility: CLINIC | Age: 39
End: 2024-06-04
Payer: COMMERCIAL

## 2024-06-04 ENCOUNTER — RESULT REVIEW (OUTPATIENT)
Age: 39
End: 2024-06-04

## 2024-06-04 ENCOUNTER — OUTPATIENT (OUTPATIENT)
Dept: OUTPATIENT SERVICES | Facility: HOSPITAL | Age: 39
LOS: 1 days | End: 2024-06-04
Payer: COMMERCIAL

## 2024-06-04 DIAGNOSIS — R92.1 MAMMOGRAPHIC CALCIFICATION FOUND ON DIAGNOSTIC IMAGING OF BREAST: ICD-10-CM

## 2024-06-04 DIAGNOSIS — Z98.890 OTHER SPECIFIED POSTPROCEDURAL STATES: Chronic | ICD-10-CM

## 2024-06-04 PROCEDURE — 77065 DX MAMMO INCL CAD UNI: CPT | Mod: 26,LT

## 2024-06-04 PROCEDURE — 88305 TISSUE EXAM BY PATHOLOGIST: CPT | Mod: 26

## 2024-06-04 PROCEDURE — 88305 TISSUE EXAM BY PATHOLOGIST: CPT

## 2024-06-04 PROCEDURE — 77065 DX MAMMO INCL CAD UNI: CPT

## 2024-06-04 PROCEDURE — A4648: CPT

## 2024-06-04 PROCEDURE — 19081 BX BREAST 1ST LESION STRTCTC: CPT | Mod: LT

## 2024-06-04 PROCEDURE — 19081 BX BREAST 1ST LESION STRTCTC: CPT

## 2024-06-07 ENCOUNTER — NON-APPOINTMENT (OUTPATIENT)
Age: 39
End: 2024-06-07

## 2024-06-11 ENCOUNTER — NON-APPOINTMENT (OUTPATIENT)
Age: 39
End: 2024-06-11

## 2024-06-12 ENCOUNTER — APPOINTMENT (OUTPATIENT)
Dept: INTERNAL MEDICINE | Facility: CLINIC | Age: 39
End: 2024-06-12
Payer: COMMERCIAL

## 2024-06-12 VITALS
HEIGHT: 65 IN | TEMPERATURE: 98.9 F | WEIGHT: 240 LBS | HEART RATE: 71 BPM | OXYGEN SATURATION: 99 % | DIASTOLIC BLOOD PRESSURE: 78 MMHG | SYSTOLIC BLOOD PRESSURE: 122 MMHG | BODY MASS INDEX: 39.99 KG/M2 | RESPIRATION RATE: 16 BRPM

## 2024-06-12 DIAGNOSIS — R06.83 SNORING: ICD-10-CM

## 2024-06-12 DIAGNOSIS — R06.09 OTHER FORMS OF DYSPNEA: ICD-10-CM

## 2024-06-12 DIAGNOSIS — G47.33 OBSTRUCTIVE SLEEP APNEA (ADULT) (PEDIATRIC): ICD-10-CM

## 2024-06-12 PROCEDURE — 99203 OFFICE O/P NEW LOW 30 MIN: CPT

## 2024-06-12 RX ORDER — PHENTERMINE HYDROCHLORIDE 15 MG/1
15 CAPSULE ORAL
Qty: 30 | Refills: 0 | Status: DISCONTINUED | COMMUNITY
Start: 2023-12-01 | End: 2024-06-12

## 2024-06-12 RX ORDER — TIRZEPATIDE 5 MG/.5ML
5 INJECTION, SOLUTION SUBCUTANEOUS
Qty: 1 | Refills: 0 | Status: DISCONTINUED | COMMUNITY
Start: 2024-03-07 | End: 2024-06-12

## 2024-06-12 RX ORDER — HYDROCORTISONE 1 %
100 MCG OINTMENT (GRAM) TOPICAL
Qty: 90 | Refills: 3 | Status: DISCONTINUED | COMMUNITY
Start: 2021-12-02 | End: 2024-06-12

## 2024-06-12 RX ORDER — TIRZEPATIDE 2.5 MG/.5ML
2.5 INJECTION, SOLUTION SUBCUTANEOUS
Qty: 3 | Refills: 0 | Status: DISCONTINUED | COMMUNITY
Start: 2023-12-01 | End: 2024-06-12

## 2024-06-12 RX ORDER — PHENTERMINE HYDROCHLORIDE 37.5 MG/1
37.5 CAPSULE ORAL
Qty: 30 | Refills: 0 | Status: DISCONTINUED | COMMUNITY
Start: 2024-01-26 | End: 2024-06-12

## 2024-06-12 NOTE — DISCUSSION/SUMMARY
[FreeTextEntry1] : Ms. Reddy is a 39-year-old female who presents for initial pulm evaluation.  She had a polysomnography examination which demonstrates mild obstructive sleep apnea with an AHI of 7.1 events per hour.  ESS was 1/24 and STOP-BANG score was 1.  Patient has no significant symptoms.  She will not require treatment at the present time.  She is morbidly obese but is currently on zap bound therapy for weight loss.  She will follow-up in this office on an as-needed basis.

## 2024-06-12 NOTE — HISTORY OF PRESENT ILLNESS
[TextBox_4] : Ms. Reddy is a 39-year-old female who presents for initial pulmonary evaluation.  She has a history of snoring.  Patient was having a physical examination with her primary care doctor and mention that she snores at night.  Ms. Reddy denies any symptoms of daytime tiredness or difficulty with cognition during the day.  She is overweight.  She had a home polysomnography examination which shows mild obstructive sleep apnea.  The AHI is 7.1 events per hour with a neeru O2 saturation of 86%.  0 minutes of study time was spent with a saturation less than or equal to 88%.  Ms. Reddy has no history of cardiopulmonary disease.  She does get some shortness of breath with exertion, however, symptoms resolved with rest.  Patient is a lifelong non-smoker.

## 2024-06-14 ENCOUNTER — APPOINTMENT (OUTPATIENT)
Dept: OBGYN | Facility: CLINIC | Age: 39
End: 2024-06-14
Payer: COMMERCIAL

## 2024-06-14 VITALS — WEIGHT: 238 LBS | BODY MASS INDEX: 39.65 KG/M2 | HEIGHT: 65 IN

## 2024-06-14 DIAGNOSIS — E66.01 MORBID (SEVERE) OBESITY DUE TO EXCESS CALORIES: ICD-10-CM

## 2024-06-14 PROCEDURE — 99212 OFFICE O/P EST SF 10 MIN: CPT

## 2024-06-14 NOTE — ASSESSMENT
[FreeTextEntry1] : BEBO RODRIGUEZ  is here for an appointment for a followup of weight loss medications.  BARIATRIC SURGERY HISTORY: n/a, OBESITY COMORBIDITIES: anxiety CURRENT ANTI-OBESITY MEDICATIONS: zepbound 5mg OBESITY MEDICATION SIDE EFFECTS: none HISTORY OF WEIGHT LOSS MEDICATIONS: phenterrmine    Goal Weight: Patient Current weight 265-->260-->238 BMI:44.14-->43.27-->36.6   Breakfast: 730am Hard boiled eggs, snack- chips, yogurt, orange, whatever is at the office Lunch: 1230-  bringing food to work, lean cuisines,salad with protein Snack: vegetables w/ greek yogurt dip, shakecology Dinner: 7-730pm- chicken, tacos, limited red meat, fish, pastas, limited vegetable, salads  Snacks: no snacking after dinner. Drinks: water only  Physical activity: bike 20-30 mins daily  Plan of Care:  Whole food eating based strategy. Increase diversity of plants/fiber in diet To continue phentermine until zepbound is back in stock Will continue with the Zepbound 5mg x 2 months

## 2024-06-14 NOTE — HISTORY OF PRESENT ILLNESS
[Home] : at home, [unfilled] , at the time of the visit. [Medical Office: ([facility])___] : at the medical office located in  [Verbal consent obtained from patient] : the patient, [unfilled] [FreeTextEntry1] : weight loss followup

## 2024-06-19 RX ORDER — TIRZEPATIDE 5 MG/.5ML
5 INJECTION, SOLUTION SUBCUTANEOUS
Qty: 1 | Refills: 1 | Status: ACTIVE | COMMUNITY
Start: 2024-05-16

## 2024-07-10 ENCOUNTER — APPOINTMENT (OUTPATIENT)
Dept: GASTROENTEROLOGY | Facility: CLINIC | Age: 39
End: 2024-07-10

## 2024-07-24 ENCOUNTER — TRANSCRIPTION ENCOUNTER (OUTPATIENT)
Age: 39
End: 2024-07-24

## 2024-07-31 ENCOUNTER — APPOINTMENT (OUTPATIENT)
Dept: GASTROENTEROLOGY | Facility: CLINIC | Age: 39
End: 2024-07-31
Payer: COMMERCIAL

## 2024-07-31 VITALS
HEIGHT: 65 IN | WEIGHT: 229.5 LBS | BODY MASS INDEX: 38.24 KG/M2 | SYSTOLIC BLOOD PRESSURE: 115 MMHG | DIASTOLIC BLOOD PRESSURE: 72 MMHG

## 2024-07-31 DIAGNOSIS — R06.09 OTHER FORMS OF DYSPNEA: ICD-10-CM

## 2024-07-31 DIAGNOSIS — D50.9 IRON DEFICIENCY ANEMIA, UNSPECIFIED: ICD-10-CM

## 2024-07-31 DIAGNOSIS — K58.9 IRRITABLE BOWEL SYNDROME W/OUT DIARRHEA: ICD-10-CM

## 2024-07-31 DIAGNOSIS — K59.00 CONSTIPATION, UNSPECIFIED: ICD-10-CM

## 2024-07-31 DIAGNOSIS — Z12.11 ENCOUNTER FOR SCREENING FOR MALIGNANT NEOPLASM OF COLON: ICD-10-CM

## 2024-07-31 DIAGNOSIS — E66.01 MORBID (SEVERE) OBESITY DUE TO EXCESS CALORIES: ICD-10-CM

## 2024-07-31 PROCEDURE — 99204 OFFICE O/P NEW MOD 45 MIN: CPT

## 2024-07-31 RX ORDER — SODIUM SULFATE, POTASSIUM SULFATE AND MAGNESIUM SULFATE 1.6; 3.13; 17.5 G/177ML; G/177ML; G/177ML
17.5-3.13-1.6 SOLUTION ORAL
Qty: 2 | Refills: 0 | Status: ACTIVE | COMMUNITY
Start: 2024-07-31 | End: 1900-01-01

## 2024-07-31 NOTE — PHYSICAL EXAM
[Hearing Threshold Finger Rub Not [place]] : hearing was normal [Normal Appearance] : the appearance of the neck was normal [Oriented To Time, Place, And Person] : oriented to person, place, and time [Normal] : heart rate was normal and rhythm regular, normal S1 and S2, no murmurs [None] : no edema [Bowel Sounds] : normal bowel sounds [Abdomen Tenderness] : non-tender [Abdomen Soft] : soft

## 2024-07-31 NOTE — PHYSICAL EXAM
[Hearing Threshold Finger Rub Not [place]] : hearing was normal [Normal Appearance] : the appearance of the neck was normal [Oriented To Time, Place, And Person] : oriented to person, place, and time [Normal] : heart rate was normal and rhythm regular, normal S1 and S2, no murmurs [None] : no edema [Bowel Sounds] : normal bowel sounds [Abdomen Tenderness] : non-tender [Abdomen Soft] : soft

## 2024-08-04 NOTE — ASSESSMENT
[FreeTextEntry1] : Pleasant 39-year-old woman with a Hx of IBS, MAYTE, mild NAOMI, obesity (on Zepbound 5 mg/week), anxiety, and migraines presents to establish care for CRC screening. No FH of CRC or colon polyps. Reports some constipation since starting Zepbound. Recommend that the pt start daily Colace (OTC, take as directed on the bottle) and schedule an EGD and colonoscopy. Discussed with the pt that she will have to hold Zepbound for 1 week prior to the procedures and hold Iron supplements 5 days prior to the procedures.   The patient will proceed with an EGD and colonoscopy. I explained to the patient the risks, alternatives and benefits to an EGD and colonoscopy. Risk including but not limited to bleeding, perforation, infection adverse medication reaction. Questions were answered. agreeable to proceed with the planned procedures.    The patient will hold NSAIDs and vitamins/supplements for 5 days prior. Otherwise continue medications as prescribed. The patient is not on diabetes medications or anticoagulation.    Patient seen and examined, overseeing documentation by Jr MORSE Will proceed with an EGD and colonoscopy. Hold Zepbound for 1 week prior to the procedures, and hold Iron supplement 5 days prior to procedures; otherwise, medications as usual. Reviewed and reconciled medications, allergies, PMHx, PSHx, SocHx, FMHx. Reviewed imaging, blood work, diagnostic testing, discussed with patient. Further recommendations pending results of above work-up and evaluation.  All questions answered Call with any questions or concerns Time spent before and after visit reviewing patient's chart

## 2024-08-04 NOTE — HISTORY OF PRESENT ILLNESS
[FreeTextEntry1] : Pleasant 39-year-old woman with a Hx of IBS, MAYTE, mild NAOMI, obesity (on Zepbound 5 mg/week), anxiety, and migraines presents to establish care for CRC screening. No FH of CRC or colon polyps. Reports some constipation since starting Zepbound.

## 2024-08-05 ENCOUNTER — TRANSCRIPTION ENCOUNTER (OUTPATIENT)
Age: 39
End: 2024-08-05

## 2024-08-19 ENCOUNTER — NON-APPOINTMENT (OUTPATIENT)
Age: 39
End: 2024-08-19

## 2024-08-23 ENCOUNTER — APPOINTMENT (OUTPATIENT)
Dept: OBGYN | Facility: CLINIC | Age: 39
End: 2024-08-23
Payer: COMMERCIAL

## 2024-08-23 VITALS — HEIGHT: 65 IN | BODY MASS INDEX: 37.12 KG/M2 | WEIGHT: 222.8 LBS

## 2024-08-23 DIAGNOSIS — G47.33 OBSTRUCTIVE SLEEP APNEA (ADULT) (PEDIATRIC): ICD-10-CM

## 2024-08-23 PROCEDURE — 99213 OFFICE O/P EST LOW 20 MIN: CPT

## 2024-08-23 RX ORDER — TIRZEPATIDE 10 MG/.5ML
10 INJECTION, SOLUTION SUBCUTANEOUS
Qty: 1 | Refills: 1 | Status: ACTIVE | COMMUNITY
Start: 2024-08-23 | End: 1900-01-01

## 2024-08-23 RX ORDER — TIRZEPATIDE 7.5 MG/.5ML
7.5 INJECTION, SOLUTION SUBCUTANEOUS
Qty: 1 | Refills: 1 | Status: ACTIVE | COMMUNITY
Start: 2024-08-19

## 2024-08-26 ENCOUNTER — NON-APPOINTMENT (OUTPATIENT)
Age: 39
End: 2024-08-26

## 2024-08-27 NOTE — HISTORY OF PRESENT ILLNESS
[Home] : at home, [unfilled] , at the time of the visit. [Medical Office: ([facility])___] : at the medical office located in  [Verbal consent obtained from patient] : the patient, [unfilled] [FreeTextEntry1] : weight loss followup

## 2024-08-27 NOTE — HISTORY OF PRESENT ILLNESS
[Home] : at home, [unfilled] , at the time of the visit. [Medical Office: ([facility])___] : at the medical office located in  [Verbal consent obtained from patient] : the patient, [unfilled] [FreeTextEntry1] : weight loss followup

## 2024-08-27 NOTE — ASSESSMENT
[FreeTextEntry1] : BEBO RODRIGUEZ  is here for an appointment for a followup of weight loss medications. Patient feels like medication is wearing off mid week  BARIATRIC SURGERY HISTORY: n/a, OBESITY COMORBIDITIES: anxiety CURRENT ANTI-OBESITY MEDICATIONS: zepbound 5mg OBESITY MEDICATION SIDE EFFECTS: none HISTORY OF WEIGHT LOSS MEDICATIONS: phenterrmine    Goal Weight: Patient Current weight 265-->260-->238--->222.8 BMI:44.14-->43.27-->36.6-->37.8   Nutrtion:  Breakfast: 730am Hard boiled eggs/protein shake snack- chips, yogurt, orange, whatever is at the office Lunch: 1230-  bringing food to work, lean cuisines,salad with protein, taco salad Snack: vegetables w/ greek yogurt dip, koia protein shake Dinner: 7-730pm- chicken, tacos, limited red meat, fish, pastas, limited vegetable, salads  Snacks: no snacking after dinner. Drinks: water only  Physical activity: bike 20-30 mins daily  Plan of Care:  Whole food eating based strategy. Increase diversity of plants/fiber in diet Will increase with the Zepbound  7.5mg  and then will increase to 10mg

## 2024-09-06 ENCOUNTER — TRANSCRIPTION ENCOUNTER (OUTPATIENT)
Age: 39
End: 2024-09-06

## 2024-09-09 ENCOUNTER — NON-APPOINTMENT (OUTPATIENT)
Age: 39
End: 2024-09-09

## 2024-09-13 ENCOUNTER — RESULT REVIEW (OUTPATIENT)
Age: 39
End: 2024-09-13

## 2024-09-13 ENCOUNTER — APPOINTMENT (OUTPATIENT)
Dept: GASTROENTEROLOGY | Facility: AMBULATORY MEDICAL SERVICES | Age: 39
End: 2024-09-13
Payer: COMMERCIAL

## 2024-09-13 PROCEDURE — 45378 DIAGNOSTIC COLONOSCOPY: CPT

## 2024-09-13 PROCEDURE — 43239 EGD BIOPSY SINGLE/MULTIPLE: CPT

## 2024-09-27 ENCOUNTER — APPOINTMENT (OUTPATIENT)
Dept: GASTROENTEROLOGY | Facility: CLINIC | Age: 39
End: 2024-09-27
Payer: COMMERCIAL

## 2024-09-27 PROCEDURE — 99443: CPT

## 2024-10-11 ENCOUNTER — APPOINTMENT (OUTPATIENT)
Dept: OPHTHALMOLOGY | Facility: CLINIC | Age: 39
End: 2024-10-11
Payer: COMMERCIAL

## 2024-10-11 ENCOUNTER — NON-APPOINTMENT (OUTPATIENT)
Age: 39
End: 2024-10-11

## 2024-10-11 DIAGNOSIS — Z23 ENCOUNTER FOR IMMUNIZATION: ICD-10-CM

## 2024-10-11 PROCEDURE — 92014 COMPRE OPH EXAM EST PT 1/>: CPT

## 2024-10-11 PROCEDURE — 92133 CPTRZD OPH DX IMG PST SGM ON: CPT

## 2024-11-13 ENCOUNTER — APPOINTMENT (OUTPATIENT)
Dept: OPHTHALMOLOGY | Facility: CLINIC | Age: 39
End: 2024-11-13
Payer: COMMERCIAL

## 2024-11-13 ENCOUNTER — NON-APPOINTMENT (OUTPATIENT)
Age: 39
End: 2024-11-13

## 2024-11-13 PROCEDURE — 92012 INTRM OPH EXAM EST PATIENT: CPT

## 2024-11-13 PROCEDURE — 92083 EXTENDED VISUAL FIELD XM: CPT

## 2024-11-15 ENCOUNTER — APPOINTMENT (OUTPATIENT)
Dept: OBGYN | Facility: CLINIC | Age: 39
End: 2024-11-15
Payer: COMMERCIAL

## 2024-11-15 VITALS — BODY MASS INDEX: 33.66 KG/M2 | WEIGHT: 202 LBS | HEIGHT: 65 IN

## 2024-11-15 DIAGNOSIS — E66.01 MORBID (SEVERE) OBESITY DUE TO EXCESS CALORIES: ICD-10-CM

## 2024-11-15 DIAGNOSIS — G47.33 OBSTRUCTIVE SLEEP APNEA (ADULT) (PEDIATRIC): ICD-10-CM

## 2024-11-15 PROCEDURE — 99214 OFFICE O/P EST MOD 30 MIN: CPT

## 2024-11-15 RX ORDER — TIRZEPATIDE 10 MG/.5ML
10 INJECTION, SOLUTION SUBCUTANEOUS
Qty: 1 | Refills: 2 | Status: ACTIVE | COMMUNITY
Start: 2024-11-15 | End: 1900-01-01

## 2024-11-20 NOTE — ASSESSMENT
[FreeTextEntry1] : 6/27/23  1) The patient is doing well but wants to prevent further medical issues by having  bariatric surgeriy.  The patient has loud snoring but no apneas have bee noted She does not have HBP. 2)She will need a sleep study.  3) Her PFTs are normal   There is no pulmonary contraindication for the partial  gastric  resection under general anesthesia  and no increased risk  for increased respiratory complications   4) f/u after the sleep study  time spent 45min counseling, education, documentation, imaging reviewed, medication reviewed, inhaler demonstrated, old records reviewed, HX and PE  this shows dyssynergia, may need to consider biofeedback therapy  -Avoid NSAIDs  -No urgent need for colonoscopy, will repeat if the patient has worsening lower GI symptoms, otherwise repeat in 2032.    Charlee was seen today for follow-up.    Diagnoses and all orders for this visit:    Cazares's esophagus without dysplasia    Constipation, unspecified constipation type    Other orders  -     senna (SENOKOT) 8.6 MG TABS tablet; Take 2 tablets by mouth every 48 hours  -     omeprazole (PRILOSEC) 20 MG delayed release capsule; Take 1 capsule by mouth Daily           RTC:6 months    Additional comments:          Thank you for allowing me to participate in the care of Ms. Hernandez. For any further questions please do not hesitate to contact me.      I have reviewed and agree with the MA/LPN ROS please refer to their documentation from today's encounter on a separate note.     Bryan Gonsalez MD  Gastroenterology & Hepatology  Office #: [phone]          this note is created with the assistance of a speech recognition program.  While intending to generate a document that actually reflects the content of the visit, the document can still have some errors including those of syntax and sound a like substitutions which may escape proof reading.  It such instances, actual meaning can be extrapolated by contextual diversion.

## 2024-12-05 ENCOUNTER — TRANSCRIPTION ENCOUNTER (OUTPATIENT)
Age: 39
End: 2024-12-05

## 2024-12-18 ENCOUNTER — TRANSCRIPTION ENCOUNTER (OUTPATIENT)
Age: 39
End: 2024-12-18

## 2024-12-29 ENCOUNTER — NON-APPOINTMENT (OUTPATIENT)
Age: 39
End: 2024-12-29

## 2025-01-06 ENCOUNTER — NON-APPOINTMENT (OUTPATIENT)
Age: 40
End: 2025-01-06

## 2025-01-06 ENCOUNTER — APPOINTMENT (OUTPATIENT)
Dept: OPHTHALMOLOGY | Facility: CLINIC | Age: 40
End: 2025-01-06

## 2025-01-06 PROCEDURE — 92285 EXTERNAL OCULAR PHOTOGRAPHY: CPT

## 2025-01-06 PROCEDURE — 99204 OFFICE O/P NEW MOD 45 MIN: CPT

## 2025-01-16 ENCOUNTER — APPOINTMENT (OUTPATIENT)
Dept: INTERNAL MEDICINE | Facility: CLINIC | Age: 40
End: 2025-01-16
Payer: COMMERCIAL

## 2025-01-16 ENCOUNTER — NON-APPOINTMENT (OUTPATIENT)
Age: 40
End: 2025-01-16

## 2025-01-16 VITALS — DIASTOLIC BLOOD PRESSURE: 70 MMHG | SYSTOLIC BLOOD PRESSURE: 110 MMHG

## 2025-01-16 DIAGNOSIS — D50.9 IRON DEFICIENCY ANEMIA, UNSPECIFIED: ICD-10-CM

## 2025-01-16 DIAGNOSIS — E66.01 MORBID (SEVERE) OBESITY DUE TO EXCESS CALORIES: ICD-10-CM

## 2025-01-16 DIAGNOSIS — R31.21 ASYMPTOMATIC MICROSCOPIC HEMATURIA: ICD-10-CM

## 2025-01-16 DIAGNOSIS — G47.33 OBSTRUCTIVE SLEEP APNEA (ADULT) (PEDIATRIC): ICD-10-CM

## 2025-01-16 DIAGNOSIS — Z01.818 ENCOUNTER FOR OTHER PREPROCEDURAL EXAMINATION: ICD-10-CM

## 2025-01-16 DIAGNOSIS — G43.009 MIGRAINE W/OUT AURA, NOT INTRACTABLE, W/OUT STATUS MIGRAINOSUS: ICD-10-CM

## 2025-01-16 PROCEDURE — 36415 COLL VENOUS BLD VENIPUNCTURE: CPT

## 2025-01-16 PROCEDURE — 93000 ELECTROCARDIOGRAM COMPLETE: CPT

## 2025-01-16 PROCEDURE — 99214 OFFICE O/P EST MOD 30 MIN: CPT

## 2025-01-19 LAB
ALBUMIN SERPL ELPH-MCNC: 4.4 G/DL
ALBUMIN SERPL ELPH-MCNC: 4.4 G/DL
ALP BLD-CCNC: 48 U/L
ALT SERPL-CCNC: 7 U/L
ANION GAP SERPL CALC-SCNC: 11 MMOL/L
APTT BLD: 33.6 SEC
AST SERPL-CCNC: 11 U/L
BASOPHILS # BLD AUTO: 0.04 K/UL
BASOPHILS NFR BLD AUTO: 0.7 %
BILIRUB DIRECT SERPL-MCNC: 0.1 MG/DL
BILIRUB INDIRECT SERPL-MCNC: 0.3 MG/DL
BILIRUB SERPL-MCNC: 0.4 MG/DL
BUN SERPL-MCNC: 15 MG/DL
CALCIUM SERPL-MCNC: 9.3 MG/DL
CHLORIDE SERPL-SCNC: 104 MMOL/L
CO2 SERPL-SCNC: 24 MMOL/L
CREAT SERPL-MCNC: 0.73 MG/DL
EGFR: 106 ML/MIN/1.73M2
EOSINOPHIL # BLD AUTO: 0.07 K/UL
EOSINOPHIL NFR BLD AUTO: 1.3 %
FERRITIN SERPL-MCNC: 19 NG/ML
GLUCOSE SERPL-MCNC: 78 MG/DL
HCT VFR BLD CALC: 33 %
HGB BLD-MCNC: 10.7 G/DL
IMM GRANULOCYTES NFR BLD AUTO: 0.2 %
INR PPP: 0.93 RATIO
IRON SERPL-MCNC: 28 UG/DL
LYMPHOCYTES # BLD AUTO: 1.59 K/UL
LYMPHOCYTES NFR BLD AUTO: 29.1 %
MAN DIFF?: NORMAL
MCHC RBC-ENTMCNC: 27.2 PG
MCHC RBC-ENTMCNC: 32.4 G/DL
MCV RBC AUTO: 83.8 FL
MONOCYTES # BLD AUTO: 0.43 K/UL
MONOCYTES NFR BLD AUTO: 7.9 %
NEUTROPHILS # BLD AUTO: 3.33 K/UL
NEUTROPHILS NFR BLD AUTO: 60.8 %
PHOSPHATE SERPL-MCNC: 3.2 MG/DL
PLATELET # BLD AUTO: 271 K/UL
POTASSIUM SERPL-SCNC: 3.9 MMOL/L
PROT SERPL-MCNC: 7 G/DL
PT BLD: 11 SEC
RBC # BLD: 3.94 M/UL
RBC # FLD: 14.1 %
SODIUM SERPL-SCNC: 139 MMOL/L
WBC # FLD AUTO: 5.47 K/UL

## 2025-01-29 ENCOUNTER — APPOINTMENT (OUTPATIENT)
Dept: OPHTHALMOLOGY | Facility: HOSPITAL | Age: 40
End: 2025-01-29

## 2025-01-29 ENCOUNTER — OUTPATIENT (OUTPATIENT)
Dept: OUTPATIENT SERVICES | Facility: HOSPITAL | Age: 40
LOS: 1 days | End: 2025-01-29
Payer: COMMERCIAL

## 2025-01-29 ENCOUNTER — TRANSCRIPTION ENCOUNTER (OUTPATIENT)
Age: 40
End: 2025-01-29

## 2025-01-29 VITALS
OXYGEN SATURATION: 100 % | DIASTOLIC BLOOD PRESSURE: 73 MMHG | WEIGHT: 192.68 LBS | HEIGHT: 66 IN | TEMPERATURE: 99 F | SYSTOLIC BLOOD PRESSURE: 121 MMHG | HEART RATE: 74 BPM | RESPIRATION RATE: 15 BRPM

## 2025-01-29 VITALS
SYSTOLIC BLOOD PRESSURE: 118 MMHG | OXYGEN SATURATION: 100 % | DIASTOLIC BLOOD PRESSURE: 78 MMHG | RESPIRATION RATE: 18 BRPM | HEART RATE: 73 BPM

## 2025-01-29 DIAGNOSIS — Z98.890 OTHER SPECIFIED POSTPROCEDURAL STATES: Chronic | ICD-10-CM

## 2025-01-29 DIAGNOSIS — H02.831 DERMATOCHALASIS OF RIGHT UPPER EYELID: ICD-10-CM

## 2025-01-29 DIAGNOSIS — H02.834 DERMATOCHALASIS OF LEFT UPPER EYELID: ICD-10-CM

## 2025-01-29 PROBLEM — Z78.9 OTHER SPECIFIED HEALTH STATUS: Chronic | Status: INACTIVE | Noted: 2022-06-28 | Resolved: 2025-01-29

## 2025-01-29 LAB — HCG UR QL: NEGATIVE — SIGNIFICANT CHANGE UP

## 2025-01-29 PROCEDURE — 15823 BLEPHARP UPR EYELID XCSV SKN: CPT | Mod: XU,50

## 2025-01-29 PROCEDURE — 81025 URINE PREGNANCY TEST: CPT

## 2025-01-29 PROCEDURE — 67904 REPAIR EYELID DEFECT: CPT | Mod: RT

## 2025-01-29 PROCEDURE — 15823 BLEPHARP UPR EYELID XCSV SKN: CPT | Mod: 50

## 2025-01-29 RX ORDER — FLUTICASONE PROPIONATE 50 UG/1
2 SPRAY, METERED NASAL
Refills: 0 | DISCHARGE

## 2025-01-29 RX ORDER — ALPRAZOLAM 2 MG
1 TABLET ORAL
Refills: 0 | DISCHARGE

## 2025-01-29 RX ORDER — KETOCONAZOLE 20 MG/G
1 AEROSOL, FOAM TOPICAL
Refills: 0 | DISCHARGE

## 2025-01-29 NOTE — ASU DISCHARGE PLAN (ADULT/PEDIATRIC) - NS MD DC FALL RISK RISK
For information on Fall & Injury Prevention, visit: [email or web address] OR  [email or web address] OR  https://www.cdc.gov/steadi/patient.html

## 2025-01-29 NOTE — ASU PATIENT PROFILE, ADULT - NSICDXPASTMEDICALHX_GEN_ALL_CORE_FT
PAST MEDICAL HISTORY:  2019 novel coronavirus disease (COVID-19)     Acute allergic rhinitis     Angiokeratoma     Chronic GERD     Dermatofibroma     HENNESSY (dyspnea on exertion)     H/O acute conjunctivitis     H/O iron deficiency anemia     H/O seborrheic dermatitis     H/O vitamin D deficiency     History of IBS     Migraine with aura and without status migrainosus     Multiple benign nevi      (normal spontaneous vaginal delivery)     NAOMI (obstructive sleep apnea)     Situational anxiety     Thyromegaly

## 2025-01-29 NOTE — ASU DISCHARGE PLAN (ADULT/PEDIATRIC) - ASU DC SPECIAL INSTRUCTIONSFT
Postop Instructions for Eyelid Surgery – Dr. Moore     After surgery instructions     - For the remainder of the surgery day and the day after surgery, apply ice to the eyes for at least 20 minutes out of every hour to reduce bruising. Any kind of cold pack or cold compress is fine (for example: ice cubes in a baggie, towel dipped in ice water, frozen gel pack). The more ice the better—you can’t overdo it!   - Apply the prescribed ointment to your stitches 4 times a day (breakfast, lunch, dinner, and before bed). If you have been given a prescription for eye drops use them as directed. Continue using these medications until your follow up appointment.   - If you have been given a prescription for oral steroids or antibiotics, take these as directed until all the pills are gone.   - Take all your regular medications and eye drops as usual.   - If you were advised to stop a blood thinner prior to surgery, start it again the evening after surgery unless other special directions are given.   - It may help minimize swelling to sleep with your head slightly elevated on a pillow   - You may shower the day after surgery. It is okay to get your stitches wet and soapy. Do not rub the stitches. Simply let soap and water run over the area and pat it gently dry. If there is any crustiness caught in the stitches you can remove it gently with a damp Q-tip.   - On the second day after surgery, you can stop using ice and start using warm compresses if desired to reduce swelling   - You may take a gentle walk, climb stairs, and do light household chores as normal   - Avoid exercise, lifting heavy weights (>10 pounds), straining to defecate, or other strenuous activities that raise your heart rate or blood pressure for 7 days after surgery.    - Avoid doing any dirty, lorenza activities or chores for 7 days after surgery.   - Do not submerge your stitches in any lake, ocean, pool, or hot tub (or any other shared water) for 14 days after surgery.   - Do not use any eye makeup or mascara until you have your postop appointment.   - Keep your incisions out of the sun for at least 6 months.         What is normal after surgery:     - It is normal to have a little bit of blood oozing around the stitches. You can simply wipe it away with a clean tissue or towel.   - It is normal for your vision to be slightly blurry. This is usually due to ointment getting in the eye.   - It is normal to have pink or bloody tears   - It is normal to have significant bruising and swelling around the eyes, even to the point of having two black eyes. It is normal for the bruising to move downwards to your lower eyelids and cheeks with gravity, even if you did not have surgery on the lower eyelids. The bruising and the majority of swelling usually resolves in about 2 weeks. However, residual swelling can linger for weeks to months.   - Your eyelids or eyelashes may feel numb for several weeks after surgery.   - It is normal for the eyes to feel especially dry, scratchy, teary, or itchy in the first few weeks after surgery. You may use artificial tear drops (over the counter) as needed to soothe the eyes.   - After lower eyelid surgery, you may have some mild double vision due to swelling around the muscles. This will normally improve as the swelling goes down.        When to call the doctor:     - There is a sudden increase in bruising and swelling or the eyelid is so swollen and hard that you cannot pry the eye open with your hands—THIS IS AN EMERGENCY   - If there is severe vision loss and all you can see is shapes or black—THIS IS AN EMERGENCY   - There is bleeding from the stitches that does not stop after holding firm pressure with a clean towel for 10 minutes without peeking   - You have severe pain that is not relieved by ice and 2 acetaminophen tablets   - You have severe pain in the eye and it feels like a stitch is constantly poking you in the eye.        Office phone:      [place] [phone]     [place]: [phone]     Our office phones are answered 24 hours a day. After business hours, please identify yourself as a patient who just had surgery and ask to speak to the doctor on call.      Dr. Moore’s work cell phone: [phone] Emergencies only, please

## 2025-01-29 NOTE — ASU PREOP CHECKLIST - NEEDLE GAUGE
Anesthetic History   No history of anesthetic complications            Review of Systems / Medical History  Patient summary reviewed, nursing notes reviewed and pertinent labs reviewed    Pulmonary          Smoker (1/2 ppd)         Neuro/Psych         Psychiatric history (postpartum depression)     Cardiovascular  Within defined limits                Exercise tolerance: >4 METS     GI/Hepatic/Renal  Within defined limits              Endo/Other        Obesity     Other Findings              Physical Exam    Airway  Mallampati: II  TM Distance: 4 - 6 cm  Neck ROM: normal range of motion   Mouth opening: Normal     Cardiovascular    Rhythm: regular  Rate: normal         Dental  No notable dental hx       Pulmonary  Breath sounds clear to auscultation               Abdominal  GI exam deferred       Other Findings            Anesthetic Plan    ASA: 2  Anesthesia type: general - popliteal fossa block          Induction: Intravenous  Anesthetic plan and risks discussed with: Patient 20

## 2025-01-29 NOTE — ASU PATIENT PROFILE, ADULT - BLOOD AVOIDANCE/RESTRICTIONS, PROFILE
Rifampin Pregnancy And Lactation Text: This medication is Pregnancy Category C and it isn't know if it is safe during pregnancy. It is also excreted in breast milk and should not be used if you are breast feeding. none

## 2025-01-29 NOTE — ASU DISCHARGE PLAN (ADULT/PEDIATRIC) - CARE PROVIDER_API CALL
Clarisa Moore  Ophthalmology  [address] [place] [address], [facility] [address]  [place] [address]  Phone: [phone]  Fax: [phone]  Follow Up Time:

## 2025-01-29 NOTE — OPERATIVE REPORT - OPERATIVE RPOSRT DETAILS
Date of Surgery: 1/29/25    Surgeon: Clarisa Moore MD    Assistant: Dre Ozuna MD    Preoperative Diagnosis: 1. Recurrent right upper eyelid involutional ptosis 2. Bilateral upper eyelid mechanical ptosis    Postoperative Diagnosis: 1. Recurrent right upper eyelid involutional ptosis 2. Bilateral upper eyelid mechanical ptosis    Surgery Performed: 1. Right upper eyelid ptosis repair by external levator advancement 99054-[id number] 2. Bilateral upper eyelid blepharoplasty 92138-85    Anesthesia: [facility]    Complications: none    Specimens: none  Indications for procedure: The patient has visually significant right upper eyelid recurrent involutional ptosis and bilateral upper eyelid mechanical ptosis with loss of superior visual field. I recommended right upper eyelid ptosis repair and  bilateral upper eyelid blepharoplasty. . The risks, benefits, and alternatives of the procedure were discussed with the patient including, but not  limited to, pain, bruising, swelling, eye irritation, tearing, dry eye, bleeding, infection, inflammation, scarring, under- or over-correction, contralateral eyelid droop (Meng response), need for revision or additional surgery, and in rare cases damage to eye or , loss of vision, or blindness. The patient elected to proceed.    Description of procedure:    The patient was identified in the holding area and both eyes were marked. Written informed consent was obtained. The patient was brought to the operating room and positioned supine. Monitored anesthesia care was established by the hospital anesthesia team.     Blepharoplasty markings were carefully designed on the bilateral upper lids taking care to achieve symmetry and leave enough skin for complete lid closure.     Local aesthetic consisting of a 1:1 mixture o f 2% lidocaine with 1:100,000 parts epinephrine and 0.5% bupivacaine with 1:200,000 parts epinephrine was injected in the upper lid.  Proparacaine was instilled in the eye. The face was prepped and draped in the usual sterile fashion for ophthalmic plastic surgery. A time out was performed confirming the correct patient, procedure, and laterality.     Attention was first turned to the right upper lid. Needle tip monopolar cautery was used to make the previously marked blepharoplasty incision. The skin flap was removed with the monopolar. Dissection was then carried out through the orbicularis oculi muscle and orbital septum using high temperature cautery. The preaponeurotic fat was dissected off of the levator up to Whitnall's ligament. The levator aponeurosis was then disinserted from the tarsal plate and dissected off of the underlying Omer's muscle. Extensive scarring from the patient's prior ptosis surgery was encountered. The levator was then advanced and secured to the tarsal plate with a double-armed 5-0 Mersilene suture. The suture was tied with a slip knot.    Attention was then turned to the left upper lid. A blepharoplasty was performed.      The patient was asked to open their eyes and adjustments were made to the lid height and contour until they were symmetric.    Next, lid crease-forming sutures were placed on the right side using 6-0 plain gut and incorporating the cut edge of the levator aponeurosis. Both upper lid incisions were closed with running 6-0 Prolene.     The face was washed and ophthalmic antibiotic ointment was applied to the incisions.     The patient tolerated the procedure without complication. They were awakened from sedation and PACU in stable condition.

## 2025-01-29 NOTE — ASU DISCHARGE PLAN (ADULT/PEDIATRIC) - FINANCIAL ASSISTANCE
[facility] provides services at a reduced cost to those who are determined to be eligible through [facility]’s financial assistance program. Information regarding [facility]’s financial assistance program can be found by going to [email or web address] or by calling [phone].

## 2025-01-31 PROBLEM — D23.9 OTHER BENIGN NEOPLASM OF SKIN, UNSPECIFIED: Chronic | Status: ACTIVE | Noted: 2025-01-29

## 2025-01-31 PROBLEM — G43.109 MIGRAINE WITH AURA, NOT INTRACTABLE, WITHOUT STATUS MIGRAINOSUS: Chronic | Status: ACTIVE | Noted: 2025-01-29

## 2025-01-31 PROBLEM — D22.9 MELANOCYTIC NEVI, UNSPECIFIED: Chronic | Status: ACTIVE | Noted: 2025-01-29

## 2025-01-31 PROBLEM — E01.0 IODINE-DEFICIENCY RELATED DIFFUSE (ENDEMIC) GOITER: Chronic | Status: ACTIVE | Noted: 2025-01-29

## 2025-01-31 PROBLEM — Z86.69 PERSONAL HISTORY OF OTHER DISEASES OF THE NERVOUS SYSTEM AND SENSE ORGANS: Chronic | Status: ACTIVE | Noted: 2025-01-29

## 2025-01-31 PROBLEM — J30.9 ALLERGIC RHINITIS, UNSPECIFIED: Chronic | Status: ACTIVE | Noted: 2025-01-29

## 2025-01-31 PROBLEM — U07.1 COVID-19: Chronic | Status: ACTIVE | Noted: 2025-01-29

## 2025-01-31 PROBLEM — Z86.2 PERSONAL HISTORY OF DISEASES OF THE BLOOD AND BLOOD-FORMING ORGANS AND CERTAIN DISORDERS INVOLVING THE IMMUNE MECHANISM: Chronic | Status: ACTIVE | Noted: 2025-01-29

## 2025-01-31 PROBLEM — Z87.2 PERSONAL HISTORY OF DISEASES OF THE SKIN AND SUBCUTANEOUS TISSUE: Chronic | Status: ACTIVE | Noted: 2025-01-29

## 2025-01-31 PROBLEM — F41.8 OTHER SPECIFIED ANXIETY DISORDERS: Chronic | Status: ACTIVE | Noted: 2025-01-29

## 2025-01-31 PROBLEM — Z86.39 PERSONAL HISTORY OF OTHER ENDOCRINE, NUTRITIONAL AND METABOLIC DISEASE: Chronic | Status: ACTIVE | Noted: 2025-01-29

## 2025-01-31 PROBLEM — R06.09 OTHER FORMS OF DYSPNEA: Chronic | Status: ACTIVE | Noted: 2025-01-29

## 2025-01-31 PROBLEM — K21.9 GASTRO-ESOPHAGEAL REFLUX DISEASE WITHOUT ESOPHAGITIS: Chronic | Status: ACTIVE | Noted: 2025-01-29

## 2025-01-31 PROBLEM — G47.33 OBSTRUCTIVE SLEEP APNEA (ADULT) (PEDIATRIC): Chronic | Status: ACTIVE | Noted: 2025-01-29

## 2025-02-04 ENCOUNTER — APPOINTMENT (OUTPATIENT)
Dept: DERMATOLOGY | Facility: CLINIC | Age: 40
End: 2025-02-04
Payer: COMMERCIAL

## 2025-02-04 DIAGNOSIS — L81.8 OTHER SPECIFIED DISORDERS OF PIGMENTATION: ICD-10-CM

## 2025-02-04 DIAGNOSIS — L21.9 SEBORRHEIC DERMATITIS, UNSPECIFIED: ICD-10-CM

## 2025-02-04 DIAGNOSIS — Z12.83 ENCOUNTER FOR SCREENING FOR MALIGNANT NEOPLASM OF SKIN: ICD-10-CM

## 2025-02-04 PROCEDURE — 99214 OFFICE O/P EST MOD 30 MIN: CPT

## 2025-02-05 ENCOUNTER — TRANSCRIPTION ENCOUNTER (OUTPATIENT)
Age: 40
End: 2025-02-05

## 2025-02-05 DIAGNOSIS — D50.8 OTHER IRON DEFICIENCY ANEMIAS: ICD-10-CM

## 2025-02-06 ENCOUNTER — TRANSCRIPTION ENCOUNTER (OUTPATIENT)
Age: 40
End: 2025-02-06

## 2025-02-07 ENCOUNTER — NON-APPOINTMENT (OUTPATIENT)
Age: 40
End: 2025-02-07

## 2025-02-07 ENCOUNTER — APPOINTMENT (OUTPATIENT)
Dept: OPHTHALMOLOGY | Facility: CLINIC | Age: 40
End: 2025-02-07
Payer: COMMERCIAL

## 2025-02-07 PROCEDURE — 92285 EXTERNAL OCULAR PHOTOGRAPHY: CPT

## 2025-02-07 PROCEDURE — 99024 POSTOP FOLLOW-UP VISIT: CPT

## 2025-02-10 DIAGNOSIS — R39.9 UNSPECIFIED SYMPTOMS AND SIGNS INVOLVING THE GENITOURINARY SYSTEM: ICD-10-CM

## 2025-02-10 DIAGNOSIS — Z11.59 ENCOUNTER FOR SCREENING FOR OTHER VIRAL DISEASES: ICD-10-CM

## 2025-02-10 DIAGNOSIS — L91.8 OTHER HYPERTROPHIC DISORDERS OF THE SKIN: ICD-10-CM

## 2025-02-10 DIAGNOSIS — Z12.39 ENCOUNTER FOR OTHER SCREENING FOR MALIGNANT NEOPLASM OF BREAST: ICD-10-CM

## 2025-02-10 DIAGNOSIS — S93.402A SPRAIN OF UNSPECIFIED LIGAMENT OF LEFT ANKLE, INITIAL ENCOUNTER: ICD-10-CM

## 2025-02-10 DIAGNOSIS — Z86.018 PERSONAL HISTORY OF OTHER BENIGN NEOPLASM: ICD-10-CM

## 2025-02-10 DIAGNOSIS — Z01.810 ENCOUNTER FOR PREPROCEDURAL CARDIOVASCULAR EXAMINATION: ICD-10-CM

## 2025-02-10 DIAGNOSIS — J30.9 ALLERGIC RHINITIS, UNSPECIFIED: ICD-10-CM

## 2025-02-10 DIAGNOSIS — Z13.21 ENCOUNTER FOR SCREENING FOR NUTRITIONAL DISORDER: ICD-10-CM

## 2025-02-10 DIAGNOSIS — Z01.811 ENCOUNTER FOR PREPROCEDURAL RESPIRATORY EXAMINATION: ICD-10-CM

## 2025-02-10 DIAGNOSIS — Z71.3 DIETARY COUNSELING AND SURVEILLANCE: ICD-10-CM

## 2025-02-10 DIAGNOSIS — Z86.69 PERSONAL HISTORY OF OTHER DISEASES OF THE NERVOUS SYSTEM AND SENSE ORGANS: ICD-10-CM

## 2025-02-17 ENCOUNTER — OUTPATIENT (OUTPATIENT)
Dept: OUTPATIENT SERVICES | Facility: HOSPITAL | Age: 40
LOS: 1 days | Discharge: HOME | End: 2025-02-17

## 2025-02-17 DIAGNOSIS — Z98.890 OTHER SPECIFIED POSTPROCEDURAL STATES: Chronic | ICD-10-CM

## 2025-02-17 DIAGNOSIS — D64.9 ANEMIA, UNSPECIFIED: ICD-10-CM

## 2025-02-18 DIAGNOSIS — Z12.83 ENCOUNTER FOR SCREENING FOR MALIGNANT NEOPLASM OF SKIN: ICD-10-CM

## 2025-02-18 DIAGNOSIS — B37.9 CANDIDIASIS, UNSPECIFIED: ICD-10-CM

## 2025-02-18 DIAGNOSIS — Z12.31 ENCOUNTER FOR SCREENING MAMMOGRAM FOR MALIGNANT NEOPLASM OF BREAST: ICD-10-CM

## 2025-02-18 PROBLEM — K29.60 EROSIVE GASTRITIS: Status: ACTIVE | Noted: 2025-02-18

## 2025-02-18 PROBLEM — Z01.818 PREOP EXAMINATION: Status: RESOLVED | Noted: 2025-01-16 | Resolved: 2025-02-18

## 2025-02-18 PROBLEM — N92.0 HEAVY PERIODS: Status: ACTIVE | Noted: 2025-02-18

## 2025-02-18 PROBLEM — D50.8 OTHER IRON DEFICIENCY ANEMIA: Status: RESOLVED | Noted: 2025-02-05 | Resolved: 2025-02-18

## 2025-02-19 ENCOUNTER — APPOINTMENT (OUTPATIENT)
Dept: HEMATOLOGY ONCOLOGY | Facility: CLINIC | Age: 40
End: 2025-02-19
Payer: COMMERCIAL

## 2025-02-19 VITALS
DIASTOLIC BLOOD PRESSURE: 82 MMHG | SYSTOLIC BLOOD PRESSURE: 115 MMHG | BODY MASS INDEX: 31.72 KG/M2 | OXYGEN SATURATION: 100 % | WEIGHT: 190.38 LBS | TEMPERATURE: 97.9 F | HEART RATE: 75 BPM | HEIGHT: 65 IN

## 2025-02-19 DIAGNOSIS — L65.9 NONSCARRING HAIR LOSS, UNSPECIFIED: ICD-10-CM

## 2025-02-19 DIAGNOSIS — N92.0 EXCESSIVE AND FREQUENT MENSTRUATION WITH REGULAR CYCLE: ICD-10-CM

## 2025-02-19 DIAGNOSIS — D50.9 IRON DEFICIENCY ANEMIA, UNSPECIFIED: ICD-10-CM

## 2025-02-19 DIAGNOSIS — K21.9 GASTRO-ESOPHAGEAL REFLUX DISEASE W/OUT ESOPHAGITIS: ICD-10-CM

## 2025-02-19 DIAGNOSIS — Z01.818 ENCOUNTER FOR OTHER PREPROCEDURAL EXAMINATION: ICD-10-CM

## 2025-02-19 DIAGNOSIS — D50.8 OTHER IRON DEFICIENCY ANEMIAS: ICD-10-CM

## 2025-02-19 DIAGNOSIS — Z52.008 UNSPECIFIED DONOR, OTHER BLOOD: ICD-10-CM

## 2025-02-19 DIAGNOSIS — K29.60 OTHER GASTRITIS W/OUT BLEEDING: ICD-10-CM

## 2025-02-19 PROCEDURE — 99203 OFFICE O/P NEW LOW 30 MIN: CPT

## 2025-02-19 RX ORDER — DOCUSATE SODIUM 100 MG/1
100 CAPSULE ORAL
Refills: 0 | Status: ACTIVE | COMMUNITY

## 2025-02-19 RX ORDER — CHOLECALCIFEROL (VITAMIN D3) 25 MCG
TABLET ORAL
Refills: 0 | Status: ACTIVE | COMMUNITY

## 2025-02-19 RX ORDER — GLUC/MSM/COLGN2/HYAL/ANTIARTH3 375-375-20
TABLET ORAL
Refills: 0 | Status: ACTIVE | COMMUNITY

## 2025-02-21 ENCOUNTER — APPOINTMENT (OUTPATIENT)
Dept: OBGYN | Facility: CLINIC | Age: 40
End: 2025-02-21
Payer: COMMERCIAL

## 2025-02-21 VITALS — BODY MASS INDEX: 31.02 KG/M2 | HEIGHT: 65 IN | WEIGHT: 186.2 LBS

## 2025-02-21 DIAGNOSIS — E66.01 MORBID (SEVERE) OBESITY DUE TO EXCESS CALORIES: ICD-10-CM

## 2025-02-21 DIAGNOSIS — G47.33 OBSTRUCTIVE SLEEP APNEA (ADULT) (PEDIATRIC): ICD-10-CM

## 2025-02-21 PROCEDURE — 99214 OFFICE O/P EST MOD 30 MIN: CPT | Mod: 95

## 2025-02-21 RX ORDER — TIRZEPATIDE 10 MG/.5ML
10 INJECTION, SOLUTION SUBCUTANEOUS
Qty: 3 | Refills: 1 | Status: ACTIVE | COMMUNITY
Start: 2025-02-21 | End: 1900-01-01

## 2025-02-24 ENCOUNTER — TRANSCRIPTION ENCOUNTER (OUTPATIENT)
Age: 40
End: 2025-02-24

## 2025-02-24 ENCOUNTER — APPOINTMENT (OUTPATIENT)
Dept: OBGYN | Facility: CLINIC | Age: 40
End: 2025-02-24
Payer: COMMERCIAL

## 2025-02-24 VITALS
HEIGHT: 65 IN | SYSTOLIC BLOOD PRESSURE: 110 MMHG | WEIGHT: 190 LBS | DIASTOLIC BLOOD PRESSURE: 72 MMHG | BODY MASS INDEX: 31.65 KG/M2

## 2025-02-24 DIAGNOSIS — N64.4 MASTODYNIA: ICD-10-CM

## 2025-02-24 PROCEDURE — 99213 OFFICE O/P EST LOW 20 MIN: CPT

## 2025-03-11 ENCOUNTER — RESULT REVIEW (OUTPATIENT)
Age: 40
End: 2025-03-11

## 2025-03-11 ENCOUNTER — APPOINTMENT (OUTPATIENT)
Dept: ULTRASOUND IMAGING | Facility: CLINIC | Age: 40
End: 2025-03-11
Payer: COMMERCIAL

## 2025-03-11 ENCOUNTER — APPOINTMENT (OUTPATIENT)
Dept: MAMMOGRAPHY | Facility: CLINIC | Age: 40
End: 2025-03-11
Payer: COMMERCIAL

## 2025-03-11 ENCOUNTER — OUTPATIENT (OUTPATIENT)
Dept: OUTPATIENT SERVICES | Facility: HOSPITAL | Age: 40
LOS: 1 days | End: 2025-03-11
Payer: COMMERCIAL

## 2025-03-11 DIAGNOSIS — N64.4 MASTODYNIA: ICD-10-CM

## 2025-03-11 DIAGNOSIS — Z98.890 OTHER SPECIFIED POSTPROCEDURAL STATES: Chronic | ICD-10-CM

## 2025-03-11 PROCEDURE — G0279: CPT | Mod: 26

## 2025-03-11 PROCEDURE — 76641 ULTRASOUND BREAST COMPLETE: CPT | Mod: 26,50

## 2025-03-11 PROCEDURE — 77066 DX MAMMO INCL CAD BI: CPT

## 2025-03-11 PROCEDURE — 76641 ULTRASOUND BREAST COMPLETE: CPT

## 2025-03-11 PROCEDURE — 77066 DX MAMMO INCL CAD BI: CPT | Mod: 26

## 2025-03-11 PROCEDURE — G0279: CPT

## 2025-03-13 ENCOUNTER — APPOINTMENT (OUTPATIENT)
Dept: INFUSION THERAPY | Facility: CLINIC | Age: 40
End: 2025-03-13

## 2025-03-13 VITALS
DIASTOLIC BLOOD PRESSURE: 84 MMHG | OXYGEN SATURATION: 100 % | SYSTOLIC BLOOD PRESSURE: 138 MMHG | TEMPERATURE: 98.4 F | HEART RATE: 82 BPM | BODY MASS INDEX: 31.82 KG/M2 | HEIGHT: 65 IN | WEIGHT: 191 LBS

## 2025-03-20 ENCOUNTER — APPOINTMENT (OUTPATIENT)
Dept: INFUSION THERAPY | Facility: CLINIC | Age: 40
End: 2025-03-20

## 2025-03-20 VITALS
HEIGHT: 65 IN | HEART RATE: 82 BPM | OXYGEN SATURATION: 100 % | SYSTOLIC BLOOD PRESSURE: 121 MMHG | BODY MASS INDEX: 31.49 KG/M2 | TEMPERATURE: 98 F | DIASTOLIC BLOOD PRESSURE: 80 MMHG | WEIGHT: 189 LBS

## 2025-03-24 ENCOUNTER — NON-APPOINTMENT (OUTPATIENT)
Age: 40
End: 2025-03-24

## 2025-03-24 ENCOUNTER — APPOINTMENT (OUTPATIENT)
Dept: OBGYN | Facility: CLINIC | Age: 40
End: 2025-03-24
Payer: COMMERCIAL

## 2025-03-24 VITALS
BODY MASS INDEX: 31.16 KG/M2 | WEIGHT: 187 LBS | SYSTOLIC BLOOD PRESSURE: 114 MMHG | HEIGHT: 65 IN | DIASTOLIC BLOOD PRESSURE: 70 MMHG

## 2025-03-24 DIAGNOSIS — Z72.51 HIGH RISK HETEROSEXUAL BEHAVIOR: ICD-10-CM

## 2025-03-24 DIAGNOSIS — Z12.4 ENCOUNTER FOR SCREENING FOR MALIGNANT NEOPLASM OF CERVIX: ICD-10-CM

## 2025-03-24 DIAGNOSIS — Z01.419 ENCOUNTER FOR GYNECOLOGICAL EXAMINATION (GENERAL) (ROUTINE) W/OUT ABNORMAL FINDINGS: ICD-10-CM

## 2025-03-24 PROCEDURE — 99396 PREV VISIT EST AGE 40-64: CPT

## 2025-03-25 LAB — HPV HIGH+LOW RISK DNA PNL CVX: NOT DETECTED

## 2025-03-26 LAB — CYTOLOGY CVX/VAG DOC THIN PREP: ABNORMAL

## 2025-03-28 ENCOUNTER — APPOINTMENT (OUTPATIENT)
Dept: OBGYN | Facility: CLINIC | Age: 40
End: 2025-03-28
Payer: COMMERCIAL

## 2025-03-28 VITALS — HEIGHT: 65 IN | BODY MASS INDEX: 30.16 KG/M2 | WEIGHT: 181 LBS

## 2025-03-28 DIAGNOSIS — G47.33 OBSTRUCTIVE SLEEP APNEA (ADULT) (PEDIATRIC): ICD-10-CM

## 2025-03-28 DIAGNOSIS — E66.01 MORBID (SEVERE) OBESITY DUE TO EXCESS CALORIES: ICD-10-CM

## 2025-03-28 PROCEDURE — 99214 OFFICE O/P EST MOD 30 MIN: CPT | Mod: 95

## 2025-03-31 ENCOUNTER — TRANSCRIPTION ENCOUNTER (OUTPATIENT)
Age: 40
End: 2025-03-31

## 2025-04-15 ENCOUNTER — NON-APPOINTMENT (OUTPATIENT)
Age: 40
End: 2025-04-15

## 2025-04-15 ENCOUNTER — APPOINTMENT (OUTPATIENT)
Dept: OPHTHALMOLOGY | Facility: CLINIC | Age: 40
End: 2025-04-15
Payer: COMMERCIAL

## 2025-04-15 PROCEDURE — 99213 OFFICE O/P EST LOW 20 MIN: CPT | Mod: 25,93

## 2025-04-22 ENCOUNTER — NON-APPOINTMENT (OUTPATIENT)
Age: 40
End: 2025-04-22

## 2025-04-22 ENCOUNTER — APPOINTMENT (OUTPATIENT)
Dept: INTERNAL MEDICINE | Facility: CLINIC | Age: 40
End: 2025-04-22
Payer: COMMERCIAL

## 2025-04-22 VITALS — SYSTOLIC BLOOD PRESSURE: 120 MMHG | DIASTOLIC BLOOD PRESSURE: 70 MMHG

## 2025-04-22 VITALS
WEIGHT: 184 LBS | BODY MASS INDEX: 30.66 KG/M2 | OXYGEN SATURATION: 99 % | TEMPERATURE: 97.9 F | HEIGHT: 65 IN | HEART RATE: 52 BPM

## 2025-04-22 DIAGNOSIS — Z00.00 ENCOUNTER FOR GENERAL ADULT MEDICAL EXAMINATION W/OUT ABNORMAL FINDINGS: ICD-10-CM

## 2025-04-22 DIAGNOSIS — R92.1 MAMMOGRAPHIC CALCIFICATION FOUND ON DIAGNOSTIC IMAGING OF BREAST: ICD-10-CM

## 2025-04-22 DIAGNOSIS — N92.0 EXCESSIVE AND FREQUENT MENSTRUATION WITH REGULAR CYCLE: ICD-10-CM

## 2025-04-22 DIAGNOSIS — D50.9 IRON DEFICIENCY ANEMIA, UNSPECIFIED: ICD-10-CM

## 2025-04-22 DIAGNOSIS — G47.33 OBSTRUCTIVE SLEEP APNEA (ADULT) (PEDIATRIC): ICD-10-CM

## 2025-04-22 DIAGNOSIS — F41.8 OTHER SPECIFIED ANXIETY DISORDERS: ICD-10-CM

## 2025-04-22 DIAGNOSIS — R06.09 OTHER FORMS OF DYSPNEA: ICD-10-CM

## 2025-04-22 DIAGNOSIS — E66.01 MORBID (SEVERE) OBESITY DUE TO EXCESS CALORIES: ICD-10-CM

## 2025-04-22 DIAGNOSIS — G43.009 MIGRAINE W/OUT AURA, NOT INTRACTABLE, W/OUT STATUS MIGRAINOSUS: ICD-10-CM

## 2025-04-22 DIAGNOSIS — E53.8 DEFICIENCY OF OTHER SPECIFIED B GROUP VITAMINS: ICD-10-CM

## 2025-04-22 DIAGNOSIS — K29.60 OTHER GASTRITIS W/OUT BLEEDING: ICD-10-CM

## 2025-04-22 PROCEDURE — 90715 TDAP VACCINE 7 YRS/> IM: CPT

## 2025-04-22 PROCEDURE — 99396 PREV VISIT EST AGE 40-64: CPT | Mod: 25

## 2025-04-22 PROCEDURE — 90471 IMMUNIZATION ADMIN: CPT

## 2025-04-22 PROCEDURE — 93000 ELECTROCARDIOGRAM COMPLETE: CPT

## 2025-05-01 ENCOUNTER — TRANSCRIPTION ENCOUNTER (OUTPATIENT)
Age: 40
End: 2025-05-01

## 2025-05-09 ENCOUNTER — APPOINTMENT (OUTPATIENT)
Dept: ULTRASOUND IMAGING | Facility: CLINIC | Age: 40
End: 2025-05-09

## 2025-05-09 ENCOUNTER — APPOINTMENT (OUTPATIENT)
Dept: MAMMOGRAPHY | Facility: CLINIC | Age: 40
End: 2025-05-09

## 2025-05-12 ENCOUNTER — NON-APPOINTMENT (OUTPATIENT)
Age: 40
End: 2025-05-12

## 2025-05-16 ENCOUNTER — NON-APPOINTMENT (OUTPATIENT)
Age: 40
End: 2025-05-16

## 2025-05-16 ENCOUNTER — APPOINTMENT (OUTPATIENT)
Dept: OPHTHALMOLOGY | Facility: CLINIC | Age: 40
End: 2025-05-16
Payer: COMMERCIAL

## 2025-05-16 PROCEDURE — 99213 OFFICE O/P EST LOW 20 MIN: CPT

## 2025-05-23 ENCOUNTER — APPOINTMENT (OUTPATIENT)
Dept: OBGYN | Facility: CLINIC | Age: 40
End: 2025-05-23

## 2025-06-19 ENCOUNTER — TRANSCRIPTION ENCOUNTER (OUTPATIENT)
Age: 40
End: 2025-06-19

## 2025-06-20 ENCOUNTER — APPOINTMENT (OUTPATIENT)
Dept: OBGYN | Facility: CLINIC | Age: 40
End: 2025-06-20
Payer: COMMERCIAL

## 2025-06-20 VITALS — BODY MASS INDEX: 29.32 KG/M2 | WEIGHT: 176 LBS | HEIGHT: 65 IN

## 2025-06-20 PROCEDURE — 99214 OFFICE O/P EST MOD 30 MIN: CPT | Mod: 95

## 2025-06-23 ENCOUNTER — TRANSCRIPTION ENCOUNTER (OUTPATIENT)
Age: 40
End: 2025-06-23

## (undated) DEVICE — FRAZIER SUCTION TIP 8FR

## (undated) DEVICE — ELCTR BOVIE PENCIL SMOKE EVACUATION

## (undated) DEVICE — GLV 6.5 PROTEXIS (BLUE)

## (undated) DEVICE — WARMING BLANKET LOWER ADULT

## (undated) DEVICE — STRYKER COLORADO N-SERIES 3CM STRAIGHT

## (undated) DEVICE — ELCTR BVI ACCU-TEMP CAUTERY SHAFT FINE TIP 1/2"

## (undated) DEVICE — DRSG CURITY GAUZE SPONGE 4 X 4" 12-PLY

## (undated) DEVICE — PROTECTOR CORNEAL BLUE ADULT

## (undated) DEVICE — SUT PROLENE 6-0 18" P-1

## (undated) DEVICE — TUBING FOR SMOKE EVACUATOR (PURPLE END)

## (undated) DEVICE — BLADE SCALPEL SAFETYLOCK #15

## (undated) DEVICE — SUT MERSILENE 5-0 18" S-24

## (undated) DEVICE — GLV 6.5 PROTEXIS (WHITE)

## (undated) DEVICE — MARKING PEN W RULER

## (undated) DEVICE — VENODYNE/SCD SLEEVE CALF MEDIUM

## (undated) DEVICE — SOL BALANCE SALT 15ML

## (undated) DEVICE — PACK VITRECTOMY

## (undated) DEVICE — ELCTR BIPOLAR CORD 12FT

## (undated) DEVICE — ELCTR REM POLYHESIVE ADULT PT RETURN 15FT

## (undated) DEVICE — ELCTR BOVIE TIP CLEANER SCRATCH PAD 2 X 2"

## (undated) DEVICE — SUT PLAIN GUT 6-0 18" G-1

## (undated) DEVICE — TUBING SUCTION 20FT